# Patient Record
Sex: FEMALE | Race: WHITE | HISPANIC OR LATINO | Employment: UNEMPLOYED | URBAN - METROPOLITAN AREA
[De-identification: names, ages, dates, MRNs, and addresses within clinical notes are randomized per-mention and may not be internally consistent; named-entity substitution may affect disease eponyms.]

---

## 2018-11-01 ENCOUNTER — OCCUPATIONAL MEDICINE (OUTPATIENT)
Dept: URGENT CARE | Facility: PHYSICIAN GROUP | Age: 38
End: 2018-11-01
Payer: COMMERCIAL

## 2018-11-01 VITALS
BODY MASS INDEX: 31.15 KG/M2 | DIASTOLIC BLOOD PRESSURE: 70 MMHG | OXYGEN SATURATION: 99 % | WEIGHT: 165 LBS | HEIGHT: 61 IN | TEMPERATURE: 97.6 F | HEART RATE: 73 BPM | SYSTOLIC BLOOD PRESSURE: 100 MMHG | RESPIRATION RATE: 14 BRPM

## 2018-11-01 DIAGNOSIS — T14.8XXA MUSCULOSKELETAL STRAIN: ICD-10-CM

## 2018-11-01 LAB
BREATH ALCOHOL COMMENT: NORMAL
POC BREATHALIZER: 0 PERCENT (ref 0–0.01)

## 2018-11-01 PROCEDURE — 99202 OFFICE O/P NEW SF 15 MIN: CPT | Performed by: FAMILY MEDICINE

## 2018-11-01 PROCEDURE — 82075 ASSAY OF BREATH ETHANOL: CPT | Performed by: FAMILY MEDICINE

## 2018-11-01 PROCEDURE — 80305 DRUG TEST PRSMV DIR OPT OBS: CPT | Performed by: FAMILY MEDICINE

## 2018-11-01 ASSESSMENT — PAIN SCALES - GENERAL: PAINLEVEL: 8=MODERATE-SEVERE PAIN

## 2018-11-01 NOTE — LETTER
"EMPLOYEE’S CLAIM FOR COMPENSATION/ REPORT OF INITIAL TREATMENT  FORM C-4    EMPLOYEE’S CLAIM - PROVIDE ALL INFORMATION REQUESTED   First Name  Garcia Last Name  Presley Givens Birthdate                    1980                Sex  female Claim Number   Home Address  [address] Age  38 y.o. Height  1.549 m (5' 1\") Weight  74.8 kg (165 lb) [facility]     [facility] Zip  [address] Telephone  [phone] (home)    Mailing Address  [address] [facility] Zip  [address] Primary Language Spoken  English    Insurer   Third Party   Ange Scott   Employee's Occupation (Job Title) When Injury or Occupational Disease Occurred       Employer's Name  [employer]  Telephone  [phone]    Employer Address  [address]  [facility]  Zip  [address]    Date of Injury  10/11/2018               Hour of Injury  4:30 PM Date Employer Notified  10/15/2018 Last Day of Work after Injury or Occupational Disease  10/31/2018 Supervisor to Whom Injury Reported  diana navarrete   Address or Location of Accident (if applicable)  [[address] ]   What were you doing at the time of accident? (if applicable)  cover trabajando lifting     How did this injury or occupational disease occur? (Be specific an answer in detail. Use additional sheet if necessary)  estavamos trabajando y mi companero q  estaba en estado de ebriead se revalo y jalo cover y me jalo asi enfrente y fue donde se prenso mi brazo con el cover y la base donde revisan    If you believe that you have an occupational disease, when did you first have knowledge of the disability and it relationship to your employment?  na Witnesses to the Accident  jonthan       Nature of Injury or Occupational Disease  Sprain  Part(s) of Body Injured or Affected  Elbow (R), ,     I certify that the above is true " and correct to the best of my knowledge and that I have provided this information in order to obtain the benefits of [place]  I hereby authorize any physician, chiropractor, surgeon, practitioner, or other person, any hospital, including [facility] or [facility], any medical service organization, any insurance company, or other institution or organization to release to each other, any medical or other information, including benefits paid or payable, pertinent to this injury or disease, except information relative to diagnosis, treatment and/or counseling for AIDS, psychological conditions, alcohol or controlled substances, for which I must give specific authorization.  A Photostat of this authorization shall be as valid as the original.     Date   Place   Employee’s Signature   THIS REPORT MUST BE COMPLETED AND MAILED WITHIN 3 WORKING DAYS OF TREATMENT   Place  [facility]  Name of Facility  [place]   Date  11/1/2018 Diagnosis  (T14.8XXA) Musculoskeletal strain Is there evidence the injured employee was under the influence of alcohol and/or another controlled substance at the time of accident?   Hour  12:09 PM Description of Injury or Disease  The encounter diagnosis was Musculoskeletal strain.     Treatment  Over-the-counter ibuprofen 600 mg 3 times daily as discussed with patient.  Regular stretching as discussed with patient.  Have you advised the patient to remain off work five days or more? No   X-Ray Findings      If Yes   From Date  To Date      From information given by the employee, together with medical evidence, can you directly connect this injury or occupational disease as job incurred?  Yes If No Full Duty  No Modified Duty  Yes   Is additional medical care by a physician indicated?  Yes If Modified Duty, Specify any Limitations / Restrictions  See form D 39.   Do  "you know of any previous injury or disease contributing to this condition or occupational disease?                            No   Date  11/1/2018 Print Doctor’s Name Daly Stiles M.D. I certify the employer’s copy of  this form was mailed on:   Address  [address] Insurer’s Use Only     [facility] Zip  [address]    Provider’s Tax ID Number  [id number] Telephone  Dept: [phone]        marielos-DALY Noel M.D.   e-Signature: Dr. Manuel Oneill, Medical Director Degree  MD        ORIGINAL-TREATING PHYSICIAN OR CHIROPRACTOR    PAGE 2-INSURER/TPA    PAGE 3-EMPLOYER    PAGE 4-EMPLOYEE             Form C-4 (rev10/07)              BRIEF DESCRIPTION OF RIGHTS AND BENEFITS  (Pursuant to [place] 616C.050)    Notice of Injury or Occupational Disease (Incident Report Form C-1): If an injury or occupational disease (OD) arises out of and in the  course of employment, you must provide written notice to your employer as soon as practicable, but no later than 7 days after the accident or  OD. Your employer shall maintain a sufficient supply of the required forms.    Claim for Compensation (Form C-4): If medical treatment is sought, the form C-4 is available at the place of initial treatment. A completed  \"Claim for Compensation\" (Form C-4) must be filed within 90 days after an accident or OD. The treating physician or chiropractor must,  within 3 working days after treatment, complete and mail to the employer, the employer's insurer and third-party , the Claim for  Compensation.    Medical Treatment: If you require medical treatment for your on-the-job injury or OD, you may be required to select a physician or  chiropractor from a list provided by your workers’ compensation insurer, if it has contracted with an Organization for Managed Care (MCO) or  Preferred Provider Organization (PPO) or providers of health care. If your employer has not entered into a contract with an MCO or PPO, you  may " select a physician or chiropractor from the Panel of Physicians and Chiropractors. Any medical costs related to your industrial injury or  OD will be paid by your insurer.    Temporary Total Disability (TTD): If your doctor has certified that you are unable to work for a period of at least 5 consecutive days, or 5  cumulative days in a 20-day period, or places restrictions on you that your employer does not accommodate, you may be entitled to TTD  compensation.    Temporary Partial Disability (TPD): If the wage you receive upon reemployment is less than the compensation for TTD to which you are  entitled, the insurer may be required to pay you TPD compensation to make up the difference. TPD can only be paid for a maximum of 24  months.    Permanent Partial Disability (PPD): When your medical condition is stable and there is an indication of a PPD as a result of your injury or  OD, within 30 days, your insurer must arrange for an evaluation by a rating physician or chiropractor to determine the degree of your PPD. The  amount of your PPD award depends on the date of injury, the results of the PPD evaluation and your age and wage.    Permanent Total Disability (PTD): If you are medically certified by a treating physician or chiropractor as permanently and totally disabled  and have been granted a PTD status by your insurer, you are entitled to receive monthly benefits not to exceed 66 2/3% of your average  monthly wage. The amount of your PTD payments is subject to reduction if you previously received a PPD award.    Vocational Rehabilitation Services: You may be eligible for vocational rehabilitation services if you are unable to return to the job due to a  permanent physical impairment or permanent restrictions as a result of your injury or occupational disease.    Transportation and Per Yeny Reimbursement: You may be eligible for travel expenses and per yeny associated with medical treatment.    Reopening: You may  be able to reopen your claim if your condition worsens after claim closure.    Appeal Process: If you disagree with a written determination issued by the insurer or the insurer does not respond to your request, you may  appeal to the Department of Administration, , by following the instructions contained in your determination letter. You must  appeal the determination within 70 days from the date of the determination letter at [address], [address], [place], [place]  [address], or [address], [place], [place] [address]. If you disagree with the  decision, you may appeal to the  Department of Administration, . You must file your appeal within 30 days from the date of the  decision  letter at [address], [address], [place], [place] [address], or [address], [place], [place] [address]. If you  disagree with a decision of an , you may file a petition for judicial review with the District Court. You must do so within 30  days of the Appeal Officer’s decision. You may be represented by an  at your own expense or you may contact the [facility] for possible  representation.    [place]  for Injured Workers (NAIW): If you disagree with a  decision, you may request that NAIW represent you  without charge at an  Hearing. For information regarding denial of benefits, you may contact the [facility] at: [address], [place] [address], [phone], or [address], [place] [address], [phone]    To File a Complaint with the Division: If you wish to file a complaint with the  of the Division of Industrial Relations (DIR),  please contact the Workers’ Compensation Section, [address], [address], [place], [place] [address], telephone [phone], or  [address], [place] [address], telephone [phone].    For assistance with Workers’ Compensation Issues: you may contact the Office of the Governor Consumer Health Assistance, [address] [place] [address], [address], [place], [place] [address], Toll Free 1-503.269.7432, Web site: [email or web address]., E-mail  [email or web address].                                                                                                                                                                                                                                   __________________________________________________________________                                                                   _________________                Employee Name / Signature                                                                                                                                                       Date                                                                                                                                                                                                     D-2 (rev. 10/07)

## 2018-11-01 NOTE — LETTER
[facility]  [address]  [place] [address]  Phone:  [phone] - Fax:  [phone]   [facility] Progress Report and Disability Certification  Date of Service: 11/1/2018   No Show:  No  Date / Time of Next Visit: 11/4/2018   Claim Information   Patient Name: Garcia Givens  Claim Number:     Employer: [employer]  Date of Injury: 10/11/2018     Insurer / TPA: Ange Scott  ID / SSN:     Occupation:    Diagnosis: The encounter diagnosis was Musculoskeletal strain.    Medical Information   Related to Industrial Injury? Yes    Subjective Complaints:  DOI 10/11/2018: Patient states that she was working with a cover when her  caused her to get hit and fall with a cover straining her right elbow.  Patient has attempted to work since that time at regular duty but has noticed that working is worsening her injury and is seeking help because the pain is getting unbearable at this time.  Patient has tried Tylenol and aspirin intermittently with moderate effect.  No previous injuries.  No numbness, tingling, weakness.    Objective Findings: Tenderness to palpation near the right radial head.  Full range of motion right elbow.  No tenderness to the olecranon process or epicondyles.  No swelling, edema, deformity.  Neurovascularly intact right upper extremity.   Pre-Existing Condition(s):     Assessment:   Initial Visit    Status: Additional Care Required  Permanent Disability:No    Plan: Medication    Diagnostics:      Comments:       Disability Information   Status: Released to Restricted Duty    From:  11/1/2018  Through: 11/4/2018 Restrictions are: Temporary   Physical Restrictions   Sitting:    Standing:    Stooping:    Bending:      Squatting:    Walking:    Climbing:    Pushing:      Pulling:  < or = to 4 hrs/day Other:    Reaching Above Shoulder (L):   Reaching Above Shoulder (R):       Reaching Below Shoulder  (L):    Reaching Below Shoulder (R):      Not to exceed Weight Limits   Carrying(hrs):   Weight Limit(lb): < or = to 10 pounds Lifting(hrs):   Weight  Limit(lb): < or = to 10 pounds   Comments:      Repetitive Actions   Hands: i.e. Fine Manipulations from Grasping:     Feet: i.e. Operating Foot Controls:     Driving / Operate Machinery:     Physician Name: Daly Stiles M.D. Physician Signature: DALY Rush M.D. e-Signature: Dr. Manuel Oneill, Medical Director   Clinic Name / Location: [address] [address] Clinic Phone Number: Dept: [phone]   Appointment Time: 11:00 Am Visit Start Time: 12:09 PM   Check-In Time:  11:37 Am Visit Discharge Time: 1:00PM   Original-Treating Physician or Chiropractor    Page 2-Insurer/TPA    Page 3-Employer    Page 4-Employee

## 2018-11-01 NOTE — PATIENT INSTRUCTIONS
Distensión muscular.  (Muscle Strain)  Shameka distensión muscular es shameka lesión que se produce cuando un músculo se estira más allá de pathak arleen normal. Cuando esto sucede, por lo general se desgarra un pequeño número de fibras musculares. La distensión muscular se califica en grados. Las distensiones de primer ron son aquellas en las cuales el desgarro y el dolor afectan a la levi cantidad de fibras musculares. Las distensiones de ambrocio y tercer ron involucran shameka proporción cada vez mayor de desgarro y dolor.  En general, la recuperación de shameka distensión muscular tarda de 1 a 2 semanas. La curación completa tarda de 5 a 6 semanas.  CAUSAS  Las distensiones musculares ocurren cuando se aplica shameka fuerza violenta y repentina sobre un músculo y scar se estira demasiado. [place] puede ocurrir cuando se levantan objetos, se practican deportes o en shameka caída.  FACTORES DE RIESGO  La distensión muscular es especialmente común en los atletas.  SIGNOS Y SÍNTOMAS  En el lugar de la distensión muscular se puede presentar lo siguiente:  · Dolor.  · Moretones.  · Hinchazón.  · Dificultad para usar el músculo debido al dolor o a un funcionamiento anormal.  DIAGNÓSTICO  El médico le hará un examen físico y le hará preguntas sobre lindy antecedentes médicos.  TRATAMIENTO  Con frecuencia, el mejor tratamiento para shameka distensión muscular es el reposo, y la aplicación de hielo y de compresas frías en la gallo de la lesión.  INSTRUCCIONES PARA EL CUIDADO EN EL HOGAR  · Use el método PRICE (por lindy siglas en inglés) de tratamiento para estimular la curación ck los primeros 2 a 3 días posteriores a la lesión. El método PRICE implica lo siguiente:  ¨ Proteger al músculo de nuevas lesiones.  ¨ Limitar la actividad y descansar la parte del cuerpo lesionada.  ¨ Aplicar hielo a la lesión. Para hacerlo, ponga hielo en shameka bolsa plástica. Coloque shameka toalla entre la piel y la bolsa de hielo. Luego aplique el hielo y déjelo actuar de 15 a  20 minutos por hora. Después del tercer día, cambie a compresas de calor húmedo.  ¨ Comprimir la gallo lesionada con shameka férula o venda elástica. Tenga cuidado de no ajustarla demasiado. [place] puede interferir con la circulación sanguínea o aumentar la hinchazón.  ¨ Mantener la gallo lesionada por encima del nivel del corazón con la mayor frecuencia posible.  · Utilice los medicamentos de venta kalen o recetados para calmar el dolor, el malestar o la fiebre, según se lo indique el médico.  · Realizar un calentamiento antes de hacer ejercicio ayuda a prevenir distensiones musculares futuras.  SOLICITE ATENCIÓN MÉDICA SI:  · Siente un dolor cada vez más intenso o hinchazón en la gallo lesionada.  · Siente adormecimiento, hormigueo o nota shameka pérdida importante de fuerza en la gallo lesionada.  ASEGÚRESE DE QUE:  · Comprende estas instrucciones.  · Controlará pathak afección.  · Recibirá ayuda de inmediato si no mejora o si empeora.  Esta información no tiene daniel fin reemplazar el consejo del médico. Asegúrese de hacerle al médico cualquier pregunta que tenga.  Document Released: 09/27/2006 Document Revised: 10/08/2014 Document Reviewed: 07/17/2014  [employer] Interactive Patient Education © 2017 Elsevier Inc.

## 2018-11-04 ENCOUNTER — OCCUPATIONAL MEDICINE (OUTPATIENT)
Dept: URGENT CARE | Facility: PHYSICIAN GROUP | Age: 38
End: 2018-11-04
Payer: COMMERCIAL

## 2018-11-04 VITALS
RESPIRATION RATE: 16 BRPM | HEART RATE: 86 BPM | BODY MASS INDEX: 31.15 KG/M2 | WEIGHT: 165 LBS | TEMPERATURE: 97.5 F | SYSTOLIC BLOOD PRESSURE: 100 MMHG | DIASTOLIC BLOOD PRESSURE: 70 MMHG | OXYGEN SATURATION: 98 % | HEIGHT: 61 IN

## 2018-11-04 DIAGNOSIS — T14.8XXA MUSCULOSKELETAL STRAIN: ICD-10-CM

## 2018-11-04 PROCEDURE — 99213 OFFICE O/P EST LOW 20 MIN: CPT | Mod: 29 | Performed by: NURSE PRACTITIONER

## 2018-11-04 RX ORDER — IBUPROFEN 200 MG
200 TABLET ORAL EVERY 6 HOURS PRN
COMMUNITY
End: 2020-02-21

## 2018-11-04 ASSESSMENT — PAIN SCALES - GENERAL: PAINLEVEL: 2=MINIMAL-SLIGHT

## 2018-11-04 NOTE — PROGRESS NOTES
"Subjective:      Seema Givens Ma is a 38 y.o. female who presents with Work-Related Injury (DOI 10/11/18, right arm pain)      Subjective Complaints:  DOI 10/11/2018: Patient states that she was working with a cover when her  caused her to get hit and fall with a cover straining her right elbow.  Patient has attempted to work since that time at regular duty but has noticed that working is worsening her injury and is seeking help because the pain is getting unbearable at this time.  Patient has tried Tylenol and aspirin intermittently with moderate effect.  No previous injuries.  No numbness, tingling, weakness.   11/4/18: first follow up : patient presents with right arm strain after a fall on 10/11/18. 2/10 pain without paresthesia. She is right hand dominant, no second job. She is has no previous injury to this area. She has been using ibuprofen and topical pain reliever.       HPI    ROS       Objective:     /70   Pulse 86   Temp 36.4 °C (97.5 °F) (Temporal)   Resp 16   Ht 1.549 m (5' 1\")   Wt 74.8 kg (165 lb)   LMP 11/01/2018   SpO2 98%   BMI 31.18 kg/m²      Physical Exam   Constitutional: She is oriented to person, place, and time. She appears well-developed and well-nourished. No distress.   Musculoskeletal: Normal range of motion.        Right elbow: She exhibits normal range of motion, no swelling, no effusion and no deformity.        Right upper arm: She exhibits no tenderness, no bony tenderness and no swelling.        Right forearm: She exhibits no tenderness, no bony tenderness and no swelling.   Neurological: She is alert and oriented to person, place, and time.   Skin: Skin is warm and dry.   Psychiatric: She has a normal mood and affect. Her behavior is normal. Thought content normal.               Assessment/Plan:     1. Musculoskeletal strain       Full duty trial  Follow up Thursday for probable full release        "

## 2018-11-04 NOTE — LETTER
[facility] [place]  [address] [facility] [place] [address]  Phone:  [phone] - Fax:  [phone]   [facility] Progress Report and Disability Certification  Date of Service: 11/4/2018   No Show:  No  Date / Time of Next Visit: 11/8/2018   Claim Information   Patient Name: Seema Givens Ma  Claim Number:     Employer: [employer]  Date of Injury: 10/11/2018     Insurer / TPA: Ange Scott  ID / SSN:     Occupation:    Diagnosis: The encounter diagnosis was Musculoskeletal strain.    Medical Information   Related to Industrial Injury? Yes    Subjective Complaints:  Subjective Complaints:  DOI 10/11/2018: Patient states that she was working with a cover when her  caused her to get hit and fall with a cover straining her right elbow.  Patient has attempted to work since that time at regular duty but has noticed that working is worsening her injury and is seeking help because the pain is getting unbearable at this time.  Patient has tried Tylenol and aspirin intermittently with moderate effect.  No previous injuries.  No numbness, tingling, weakness.   11/4/18: first follow up : patient presents with right arm strain after a fall on 10/11/18. 2/10 pain without paresthesia. She is right hand dominant, no second job. She is has no previous injury to this area. She has been using ibuprofen and topical pain reliever.     Objective Findings: Physical Exam   Constitutional: She is oriented to person, place, and time. She appears well-developed and well-nourished. No distress.   Musculoskeletal: Normal range of motion.        Right elbow: She exhibits normal range of motion, no swelling, no effusion and no deformity.        Right upper arm: She exhibits no tenderness, no bony tenderness and no swelling.        Right forearm: She exhibits no tenderness, no bony tenderness and no swelling.   Neurological: She is alert and oriented to  person, place, and time.   Skin: Skin is warm and dry.   Psychiatric: She has a normal mood and affect. Her behavior is normal. Thought content normal.      Pre-Existing Condition(s):     Assessment:   Condition Improved    Status: Additional Care Required  Permanent Disability:No    Plan:      Diagnostics:      Comments:       Disability Information   Status: Released to Full Duty    From:  11/4/2018  Through: 11/8/2018 Restrictions are: Temporary   Physical Restrictions   Sitting:    Standing:    Stooping:    Bending:      Squatting:    Walking:    Climbing:    Pushing:      Pulling:    Other:    Reaching Above Shoulder (L):   Reaching Above Shoulder (R):       Reaching Below Shoulder (L):    Reaching Below Shoulder (R):      Not to exceed Weight Limits   Carrying(hrs):   Weight Limit(lb):   Lifting(hrs):   Weight  Limit(lb):     Comments:      Repetitive Actions   Hands: i.e. Fine Manipulations from Grasping:     Feet: i.e. Operating Foot Controls:     Driving / Operate Machinery:     Physician Name: AMANDA Drummond Physician Signature: ADAM Martin e-Signature: Dr. Manuel Oneill, Medical Director   Clinic Name / Location: [facility] [address] [address] Clinic Phone Number: Dept: [phone]   Appointment Time: 9:20 Am Visit Start Time: 9:29 AM   Check-In Time:  9:15 Am Visit Discharge Time:  9:45 AM    Original-Treating Physician or Chiropractor    Page 2-Insurer/TPA    Page 3-Employer    Page 4-Employee

## 2018-11-05 NOTE — PROGRESS NOTES
"Subjective:     Seema Givens Ma is a 38 y.o. female who presents for Work-Related Injury (DOI 10/11/18, right arm injury)    DOI 10/11/2018: Patient states that she was working with a cover when her  caused her to get hit and fall with a cover straining her right elbow.  Patient has attempted to work since that time at regular duty but has noticed that working is worsening her injury and is seeking help because the pain is getting unbearable at this time.  Patient has tried Tylenol and aspirin intermittently with moderate effect.  No previous injuries.  No numbness, tingling, weakness.   HPI  ROS  No Known Allergies   Objective:   /70   Pulse 73   Temp 36.4 °C (97.6 °F) (Temporal)   Resp 14   Ht 1.549 m (5' 1\")   Wt 74.8 kg (165 lb)   LMP 11/01/2018   SpO2 99%   BMI 31.18 kg/m²   Physical Exam   Constitutional: She is oriented to person, place, and time. She appears well-developed and well-nourished. No distress.   HENT:   Head: Normocephalic and atraumatic.   Eyes: Pupils are equal, round, and reactive to light. Conjunctivae and EOM are normal.   Cardiovascular: Normal rate, regular rhythm, normal heart sounds and intact distal pulses.    No murmur heard.  Pulmonary/Chest: Effort normal and breath sounds normal. No respiratory distress.   Abdominal: Soft. Bowel sounds are normal. She exhibits no distension. There is no tenderness.   Musculoskeletal: Normal range of motion.   Neurological: She is alert and oriented to person, place, and time. She has normal reflexes. No sensory deficit.   Skin: Skin is warm and dry.   Psychiatric: She has a normal mood and affect. Her behavior is normal.     Tenderness to palpation near the right radial head.  Full range of motion right elbow.  No tenderness to the olecranon process or epicondyles.  No swelling, edema, deformity.  Neurovascularly intact right upper extremity.  Assessment/Plan:   1. Musculoskeletal strain  - POCT Breath Alcohol " Test  Differential diagnosis, natural history, supportive care, and indications for immediate follow-up discussed.    Use over-the-counter pain reliever, such as acetaminophen (Tylenol), ibuprofen (Advil, Motrin) or naproxen (Aleve) as needed; follow package directions for dosing.

## 2018-11-08 ENCOUNTER — OCCUPATIONAL MEDICINE (OUTPATIENT)
Dept: URGENT CARE | Facility: PHYSICIAN GROUP | Age: 38
End: 2018-11-08
Payer: COMMERCIAL

## 2018-11-08 VITALS
HEIGHT: 61 IN | HEART RATE: 65 BPM | RESPIRATION RATE: 14 BRPM | DIASTOLIC BLOOD PRESSURE: 78 MMHG | BODY MASS INDEX: 31.15 KG/M2 | TEMPERATURE: 97.9 F | OXYGEN SATURATION: 97 % | SYSTOLIC BLOOD PRESSURE: 118 MMHG | WEIGHT: 165 LBS

## 2018-11-08 DIAGNOSIS — S46.911D STRAIN OF RIGHT ELBOW, SUBSEQUENT ENCOUNTER: ICD-10-CM

## 2018-11-08 PROCEDURE — 99214 OFFICE O/P EST MOD 30 MIN: CPT | Mod: 29 | Performed by: PHYSICIAN ASSISTANT

## 2018-11-08 ASSESSMENT — ENCOUNTER SYMPTOMS
EYE REDNESS: 0
FEVER: 0
TINGLING: 0
BACK PAIN: 0
JOINT SWELLING: 0
FALLS: 0
SENSORY CHANGE: 0
CHILLS: 0
EYE DISCHARGE: 0
SORE THROAT: 0

## 2018-11-08 NOTE — PROGRESS NOTES
"Subjective:      Presley Stephenson is a 38 y.o. female who presents with Follow-Up (wc fv  r elbow not getting better )      DOI 10/11/2018: Visit #3- PT reports that her elbow is feeling worse- in particular with lifting any type of weight and turning of her right wrist as patient feels the pain in her elbow.  Patient reports that she has been wrapping her elbow with mild relief his symptoms.  She is intermittently used ice and over-the-counter NSAIDs for slight relief.  Patient reports that she has not been allowed to return to work the last few days however does report simple movements like picking up a mild jug,cause pain into her elbow.   Injury: Patient states that she was working with a cover when her  caused her to get hit and fall with a cover straining her right elbow.  Patient has attempted to work since that time at regular duty but has noticed that working is worsening her injury and is seeking help because the pain is getting unbearable at this time.     Other   Chronicity: 10/11. The current episode started 1 to 4 weeks ago. The problem occurs intermittently. The problem has been waxing and waning. Pertinent negatives include no chills, congestion, fever, joint swelling, rash or sore throat. Exacerbated by: As above.  She has tried ice and NSAIDs (Compression) for the symptoms.       Review of Systems   Constitutional: Negative for chills and fever.   HENT: Negative for congestion and sore throat.    Eyes: Negative for discharge and redness.   Musculoskeletal: Positive for joint pain. Negative for back pain, falls and joint swelling.   Skin: Negative for rash.   Neurological: Negative for tingling and sensory change.   All other systems reviewed and are negative.         Objective:     /78   Pulse 65   Temp 36.6 °C (97.9 °F) (Temporal)   Resp 14   Ht 1.549 m (5' 1\")   Wt 74.8 kg (165 lb)   LMP 11/01/2018   SpO2 97%   BMI 31.18 kg/m²    PMH:  has no past medical history " on file.  MEDS:   Current Outpatient Prescriptions:   •  ibuprofen (MOTRIN) 200 MG Tab, Take 200 mg by mouth every 6 hours as needed., Disp: , Rfl:   ALLERGIES: No Known Allergies  SURGHX: No past surgical history on file.  SOCHX:  reports that she has never smoked. She has never used smokeless tobacco. She reports that she does not drink alcohol or use drugs.  FH: Family history was reviewed, no pertinent findings to report    Physical Exam   Constitutional: She is oriented to person, place, and time. She appears well-developed and well-nourished. No distress.   HENT:   Head: Normocephalic and atraumatic.   Eyes: Pupils are equal, round, and reactive to light. Conjunctivae and EOM are normal.   Neck: Normal range of motion. Neck supple. No tracheal deviation present.   Cardiovascular: Normal rate.    Pulmonary/Chest: Effort normal.   Musculoskeletal: Normal range of motion.   Neurological: She is alert and oriented to person, place, and time.   Skin: Skin is warm.   Psychiatric: She has a normal mood and affect. Her behavior is normal. Judgment and thought content normal.   Vitals reviewed.      Right elbow- FROM- however painful supination- slight tenderness along bilateral lateral and medical epicondyles- without posterior elbow tenderness. N/V intact distally. Wrist/hand NT. N/V intact distally.          Assessment/Plan:     1. Strain of right elbow, subsequent encounter  - REFERRAL TO PHYSICAL THERAPY Reason for Therapy: Eval/Treat/Report    Please see D39 for further information- pt. Feeling worse at this time- referral to PT was made, light duty. F/U with [facility] in 2 weeks.   Ice, NSAIDs, compression wrap as discussed.   RTC sooner for worsening symptoms.

## 2018-11-08 NOTE — LETTER
[facility]  [address]  [place] [address]  Phone:  [phone] - Fax:  [phone]   [facility] Progress Report and Disability Certification  Date of Service: 11/8/2018   No Show:  No  Date / Time of Next Visit: 11/22/2018   Claim Information   Patient Name: Presley Stephenson  Claim Number:     Employer: [employer]  Date of Injury: 10/11/2018     Insurer / TPA:   Ange Scott ID / SSN:     Occupation:    Diagnosis: The encounter diagnosis was Strain of right elbow, subsequent encounter.    Medical Information   Related to Industrial Injury? Yes    Subjective Complaints:  DOI 10/11/2018: Visit #3- PT reports that her elbow is feeling worse- in particular with lifting any type of weight and turning of her right wrist as patient feels the pain in her elbow.  Patient reports that she has been wrapping her elbow with mild relief his symptoms.  She is intermittently used ice and over-the-counter NSAIDs for slight relief.  Patient reports that she has not been allowed to return to work the last few days however does report simple movements like picking up a mild jug,cause pain into her elbow.   Injury: Patient states that she was working with a cover when her  caused her to get hit and fall with a cover straining her right elbow.  Patient has attempted to work since that time at regular duty but has noticed that working is worsening her injury and is seeking help because the pain is getting unbearable at this time.   Objective Findings: Right elbow- FROM- however painful supination- slight tenderness along bilateral lateral and medical epicondyles- without posterior elbow tenderness. N/V intact distally. Wrist/hand NT. N/V intact distally.      Pre-Existing Condition(s):     Assessment:   Condition Same    Status: Discharged / Care Transfer  Permanent Disability:No    [facility]  Comments:Referral to PT  was made today. [facility] 2 weeks.     Diagnostics:      Comments:       Disability Information   Status: Released to Restricted Duty    From:  2018  Through: 2018 Restrictions are: Temporary   Physical Restrictions   Sitting:    Standing:    Stooping:    Bending:      Squatting:    Walking:    Climbing:    Pushing:      Pullin hrs/day Other:    Reaching Above Shoulder (L):   Reaching Above Shoulder (R):       Reaching Below Shoulder (L):    Reaching Below Shoulder (R):      Not to exceed Weight Limits   Carrying(hrs):   Weight Limit(lb): < or = to 10 pounds Lifting(hrs):   Weight  Limit(lb): < or = to 10 pounds   Comments: Referral to PT was made today, pt. Is to be on light duty and will follow up with [facility]- in 2 weeks.   Ice, compression bandage as discussed, and NSAIDs.     Repetitive Actions   Hands: i.e. Fine Manipulations from Grasping:     Feet: i.e. Operating Foot Controls:     Driving / Operate Machinery:     Physician Name: Mac Barrow P.A.-C. Physician Signature: MAC Strong P.A.-C. e-Signature: Dr. Manuel Oneill, Medical Director   Clinic Name / Location: [address] [address] Clinic Phone Number: Dept: [phone]   Appointment Time: 10:00 Am Visit Start Time: 9:24 AM   Check-In Time:  9:16 Am Visit Discharge Time:  10:00AM   Original-Treating Physician or Chiropractor    Page 2-Insurer/TPA    Page 3-Employer    Page 4-Employee

## 2018-11-21 ENCOUNTER — APPOINTMENT (OUTPATIENT)
Dept: RADIOLOGY | Facility: IMAGING CENTER | Age: 38
End: 2018-11-21
Attending: PREVENTIVE MEDICINE
Payer: COMMERCIAL

## 2018-11-21 ENCOUNTER — OCCUPATIONAL MEDICINE (OUTPATIENT)
Dept: OCCUPATIONAL MEDICINE | Facility: CLINIC | Age: 38
End: 2018-11-21
Payer: COMMERCIAL

## 2018-11-21 VITALS
DIASTOLIC BLOOD PRESSURE: 64 MMHG | HEART RATE: 92 BPM | OXYGEN SATURATION: 97 % | BODY MASS INDEX: 30.55 KG/M2 | SYSTOLIC BLOOD PRESSURE: 102 MMHG | WEIGHT: 166 LBS | HEIGHT: 62 IN | TEMPERATURE: 97.7 F

## 2018-11-21 DIAGNOSIS — M77.11 LATERAL EPICONDYLITIS OF RIGHT ELBOW: ICD-10-CM

## 2018-11-21 PROCEDURE — 73080 X-RAY EXAM OF ELBOW: CPT | Mod: TC,RT,29 | Performed by: NURSE PRACTITIONER

## 2018-11-21 PROCEDURE — 99203 OFFICE O/P NEW LOW 30 MIN: CPT | Performed by: PREVENTIVE MEDICINE

## 2018-11-21 RX ORDER — PREDNISONE 20 MG/1
40 TABLET ORAL DAILY
Qty: 14 TAB | Refills: 0 | Status: SHIPPED | OUTPATIENT
Start: 2018-11-21 | End: 2018-11-28

## 2018-11-21 RX ORDER — DICLOFENAC SODIUM 75 MG/1
75 TABLET, DELAYED RELEASE ORAL 2 TIMES DAILY
Qty: 60 TAB | Refills: 0 | Status: SHIPPED | OUTPATIENT
Start: 2018-11-21 | End: 2019-01-21

## 2018-11-21 NOTE — LETTER
[address],   [address] [place] [address]  Phone:  [phone] - Fax:  [phone]   Occupational Health [facility] Progress Report and Disability Certification  Date of Service: 11/21/2018   No Show:  No  Date / Time of Next Visit: 12/11/2018 @ 11AM   Claim Information   Patient Name: Presley Stephenson  Claim Number:     Employer: [employer]  Date of Injury: 10/11/2018     Insurer / TPA:   Ange ID / SSN:     Occupation:    Diagnosis: The encounter diagnosis was Lateral epicondylitis of right elbow.    Medical Information   Related to Industrial Injury?   Comments:indeterminate    Subjective Complaints:  DOI 10/11/2018: 37 yo female presents with right elbow injury.  She was moving a cover when a coworker bumped her and she hit her right elbow.  She did not present to urgent care for 3 weeks.  She has been icing, using ibuprofen and wrapping it in Ace bandage.  Patient states that her right elbow pain seems to be worsening.  Pain is mostly on the lateral side of the elbow.  Pain is worse with wrist flexion extension.  She has difficulty lifting things.  She states even showering is painful at this point.  She has been taking ibuprofen with minimal relief.  She has noted couple episodes of tingling in the right hand.  Denies prior right elbow injuries.   Objective Findings: Right elbow: No gross deformity.  Tenderness over the lateral epicondyle and proximal forearm.  Full range of motion with pain with resisted supination, wrist flexion/extension.   Pre-Existing Condition(s):     Assessment:   Condition Same    Status: Additional Care Required  Permanent Disability:No    Plan:      Diagnostics:      Comments:  XR Right elbow: No acute deformity as read by me  Refer to physical therapy pending  Prescribed prednisone and diclofenac  Recommend tennis elbow strap  Restricted duty  Follow-up 3 weeks    Disability Information      Status: Released to Restricted Duty    From:  11/21/2018  Through: 12/11/2018 Restrictions are: Temporary   Physical Restrictions   Sitting:    Standing:    Stooping:    Bending:      Squatting:    Walking:    Climbing:    Pushing:      Pulling:    Other:    Reaching Above Shoulder (L):   Reaching Above Shoulder (R):       Reaching Below Shoulder (L):    Reaching Below Shoulder (R):      Not to exceed Weight Limits   Carrying(hrs):   Weight Limit(lb):   Lifting(hrs):   Weight  Limit(lb):     Comments: Limit right arm to less than 10 lbs lift/push/pull.    Repetitive Actions   Hands: i.e. Fine Manipulations from Grasping:     Feet: i.e. Operating Foot Controls:     Driving / Operate Machinery:     Physician Name: Dung Tomlin D.O. Physician Signature: DUNG Rodrigez D.O. e-Signature: Dr. Manuel Oneill, Medical Director   Clinic Name / Location: [address],   [address] [place] [address] Clinic Phone Number: Dept: [phone]   Appointment Time: 10:00 Am Visit Start Time: 9:51 AM   Check-In Time:  9:45 Am Visit Discharge Time:  10:51 AM   Original-Treating Physician or Chiropractor    Page 2-Insurer/TPA    Page 3-Employer    Page 4-Employee

## 2018-11-21 NOTE — PROGRESS NOTES
"Subjective:      Presley Stephenson is a 38 y.o. female who presents with Follow-Up (WC DOI 10/11/2018 R elbow -same-)      DOI 10/11/2018: 39 yo female presents with right elbow injury.  She was moving a cover when a coworker bumped her and she hit her right elbow.  She did not present to urgent care for 3 weeks.  She has been icing, using ibuprofen and wrapping it in Ace bandage.  Patient states that her right elbow pain seems to be worsening.  Pain is mostly on the lateral side of the elbow.  Pain is worse with wrist flexion extension.  She has difficulty lifting things.  She states even showering is painful at this point.  She has been taking ibuprofen with minimal relief.  She has noted couple episodes of tingling in the right hand.  Denies prior right elbow injuries.     HPI    ROS  ROS: All systems were reviewed on intake form, form was reviewed and signed. See scanned documents in media. Pertinent positives and negatives included in HPI.    PMH: No pertinent past medical history to this problem  MEDS: Medications were reviewed in Epic  ALLERGIES: No Known Allergies  SOCHX: Works as a at an  at [email or web address]  FH: No pertinent family history to this problem     Objective:     /64 (BP Location: Left arm, Patient Position: Sitting)   Pulse 92   Temp 36.5 °C (97.7 °F)   Ht 1.575 m (5' 2\")   Wt 75.3 kg (166 lb)   LMP 11/01/2018   SpO2 97%   BMI 30.36 kg/m²      Physical Exam   Constitutional: She is oriented to person, place, and time. She appears well-developed and well-nourished.   HENT:   Right Ear: External ear normal.   Left Ear: External ear normal.   Eyes: Conjunctivae and EOM are normal.   Cardiovascular: Normal rate.    Pulmonary/Chest: Effort normal.   Neurological: She is alert and oriented to person, place, and time.   Skin: Skin is warm and dry.   Psychiatric: She has a normal mood and affect. Judgment normal.       Right elbow: No gross deformity.  Tenderness over " the lateral epicondyle and proximal forearm.  Full range of motion with pain with resisted supination, wrist flexion/extension.       Assessment/Plan:     1. Lateral epicondylitis of right elbow  - DX-ELBOW-COMPLETE 3+ RIGHT; Future    XR Right elbow: No acute deformity as read by me  Refer to physical therapy pending  Prescribed prednisone and diclofenac  Recommend tennis elbow strap  Restricted duty  Follow-up 3 weeks

## 2018-12-11 ENCOUNTER — OCCUPATIONAL MEDICINE (OUTPATIENT)
Dept: OCCUPATIONAL MEDICINE | Facility: CLINIC | Age: 38
End: 2018-12-11
Payer: COMMERCIAL

## 2018-12-11 VITALS
HEART RATE: 63 BPM | BODY MASS INDEX: 31.1 KG/M2 | HEIGHT: 62 IN | DIASTOLIC BLOOD PRESSURE: 68 MMHG | WEIGHT: 169 LBS | TEMPERATURE: 97 F | RESPIRATION RATE: 16 BRPM | OXYGEN SATURATION: 100 % | SYSTOLIC BLOOD PRESSURE: 106 MMHG

## 2018-12-11 DIAGNOSIS — M77.11 LATERAL EPICONDYLITIS OF RIGHT ELBOW: ICD-10-CM

## 2018-12-11 PROCEDURE — 99213 OFFICE O/P EST LOW 20 MIN: CPT | Performed by: PREVENTIVE MEDICINE

## 2018-12-11 ASSESSMENT — PAIN SCALES - GENERAL: PAINLEVEL: 8=MODERATE-SEVERE PAIN

## 2018-12-11 ASSESSMENT — ENCOUNTER SYMPTOMS
SENSORY CHANGE: 0
TINGLING: 0

## 2018-12-11 NOTE — PROGRESS NOTES
"Subjective:      Presley Stephenson is a 38 y.o. female who presents with Follow-Up (WC DOI 10/11/2018 R elbow)      DOI 10/11/2018: 37 yo female presents with right elbow injury.  She was moving a cover when a coworker bumped her and she hit her right elbow.  She did not present to urgent care for 3 weeks.  Patient states overall right elbow pain is about the same.  He does continue to have pain with resisted supination and at the lateral condyle.  She states she is using a topical OTC ointment which does help, she believes it IcyHot.  She is not working due to restrictions.  She states that they will not let her work with any restrictions.  Physical therapy has yet to be approved.     HPI    Review of Systems   Neurological: Negative for tingling and sensory change.     SOCHX: Works as a  at [employer]   FH: No pertinent family history to this problem.     Objective:     /68 (BP Location: Right arm, Patient Position: Sitting, BP Cuff Size: Adult)   Pulse 63   Temp 36.1 °C (97 °F) (Temporal)   Resp 16   Ht 1.575 m (5' 2\")   Wt 76.7 kg (169 lb)   SpO2 100%   BMI 30.91 kg/m²      Physical Exam   Constitutional: She is oriented to person, place, and time. She appears well-developed and well-nourished.   Cardiovascular: Normal rate.    Pulmonary/Chest: Effort normal.   Neurological: She is alert and oriented to person, place, and time.   Skin: Skin is warm and dry.   Psychiatric: She has a normal mood and affect. Judgment normal.       Right elbow: No gross deformity.  Tenderness over the lateral epicondyle.  Full range of motion with pain with resisted supination.       Assessment/Plan:     1. Lateral epicondylitis of right elbow  Referral to physical therapy pending  Continue diclofenac  Recommend tennis elbow strap  Restricted duty  Follow-up 3 weeks      "

## 2018-12-11 NOTE — LETTER
[address],   [address] [place] [address]  Phone:  [phone] - Fax:  [phone]   Occupational Health [facility] Progress Report and Disability Certification  Date of Service: 12/11/2018   No Show:  No  Date / Time of Next Visit: 1/3/2019 @ 2:30 PM   Claim Information   Patient Name: Presley Stephenson  Claim Number:     Employer: [employer]  Date of Injury: 10/11/2018     Insurer / TPA:   Ange ID / SSN:     Occupation:    Diagnosis: The encounter diagnosis was Lateral epicondylitis of right elbow.    Medical Information   Related to Industrial Injury?   Comments:indeterminate    Subjective Complaints:  DOI 10/11/2018: 39 yo female presents with right elbow injury.  She was moving a cover when a coworker bumped her and she hit her right elbow.  She did not present to urgent care for 3 weeks.  Patient states overall right elbow pain is about the same.  He does continue to have pain with resisted supination and at the lateral condyle.  She states she is using a topical OTC ointment which does help, she believes it IcyHot.  She is not working due to restrictions.  She states that they will not let her work with any restrictions.  Physical therapy has yet to be approved.   Objective Findings: Right elbow: No gross deformity.  Tenderness over the lateral epicondyle.  Full range of motion with pain with resisted supination.   Pre-Existing Condition(s):     Assessment:   Condition Same    Status: Additional Care Required  Permanent Disability:No    Plan:      Diagnostics:      Comments:  Referral to physical therapy pending  Continue diclofenac  Recommend tennis elbow strap  Restricted duty  Follow-up 3 weeks    Disability Information   Status: Released to Restricted Duty    From:  12/11/2018  Through: 1/3/2019 Restrictions are: Temporary   Physical Restrictions   Sitting:    Standing:    Stooping:    Bending:       Squatting:    Walking:    Climbing:    Pushing:      Pulling:    Other:    Reaching Above Shoulder (L):   Reaching Above Shoulder (R):       Reaching Below Shoulder (L):    Reaching Below Shoulder (R):      Not to exceed Weight Limits   Carrying(hrs):   Weight Limit(lb):   Lifting(hrs):   Weight  Limit(lb):     Comments: Limit right arm to less than 10 lbs lift/push/pull.    Repetitive Actions   Hands: i.e. Fine Manipulations from Grasping:     Feet: i.e. Operating Foot Controls:     Driving / Operate Machinery:     Physician Name: Dung Tomlin D.O. Physician Signature: [email or web address] LEVI D.O. e-Signature: Dr. Manuel Oneill, Medical Director   Clinic Name / Location: [address],   [address] [address] Clinic Phone Number: Dept: [phone]   Appointment Time: 11:00 Am Visit Start Time: 10:41 AM   Check-In Time:  10:41 Am Visit Discharge Time:  11:15 AM   Original-Treating Physician or Chiropractor    Page 2-Insurer/TPA    Page 3-Employer    Page 4-Employee

## 2019-01-21 ENCOUNTER — OCCUPATIONAL MEDICINE (OUTPATIENT)
Dept: OCCUPATIONAL MEDICINE | Facility: CLINIC | Age: 39
End: 2019-01-21
Payer: COMMERCIAL

## 2019-01-21 VITALS
DIASTOLIC BLOOD PRESSURE: 84 MMHG | OXYGEN SATURATION: 96 % | WEIGHT: 169 LBS | HEART RATE: 63 BPM | SYSTOLIC BLOOD PRESSURE: 122 MMHG | RESPIRATION RATE: 12 BRPM | BODY MASS INDEX: 31.1 KG/M2 | HEIGHT: 62 IN | TEMPERATURE: 97.5 F

## 2019-01-21 DIAGNOSIS — M77.11 LATERAL EPICONDYLITIS OF RIGHT ELBOW: ICD-10-CM

## 2019-01-21 PROCEDURE — 99213 OFFICE O/P EST LOW 20 MIN: CPT | Performed by: PREVENTIVE MEDICINE

## 2019-01-21 RX ORDER — MELOXICAM 7.5 MG/1
7.5 TABLET ORAL DAILY
Qty: 30 TAB | Refills: 0 | Status: SHIPPED | OUTPATIENT
Start: 2019-01-21 | End: 2020-02-21

## 2019-01-21 ASSESSMENT — ENCOUNTER SYMPTOMS
TINGLING: 0
SENSORY CHANGE: 0

## 2019-01-21 NOTE — LETTER
[address],   [address] [place] [address]  Phone:  [phone] - Fax:  [phone]   Occupational Health [facility] Progress Report and Disability Certification  Date of Service: 1/21/2019   No Show:  No  Date / Time of Next Visit: 2/20/2019 @ 10:15    Claim Information   Patient Name: Presley Stephenson  Claim Number:     Employer: [employer]  Date of Injury: 10/11/2018     Insurer / TPA:   Ange ID / SSN:     Occupation:    Diagnosis: The encounter diagnosis was Lateral epicondylitis of right elbow.    Medical Information   Related to Industrial Injury? Yes    Subjective Complaints:  DOI 10/11/2018: 37 yo female presents with right elbow injury.  She was moving a cover when a coworker bumped her and she hit her right elbow.  Patient was being treated but last month her claim was denied and physical therapy denied.  She appealed and her claim was approved.  Patient states that overall symptoms are about the same.  She states that she does not use the right arm there is not much pain.  She states pain is worse with lifting pushing/pulling.  She has been using a tennis elbow strap which does seem to help.  Occasional ibuprofen for relief.   Objective Findings: Right elbow: No gross deformity.  Tenderness over the lateral epicondyle.  Full range of motion. Pain with resisted supination otherwise strength intact.   Pre-Existing Condition(s):     Assessment:   Condition Same    Status: Additional Care Required  Permanent Disability:No    Plan:      Diagnostics:      Comments:  Referral to physical therapy  Prescribed meloxicam  Continue restricted duty  Follow up 3 weeks    Disability Information   Status: Released to Restricted Duty    From:  1/21/2019  Through: 2/20/2019 Restrictions are: Temporary   Physical Restrictions   Sitting:    Standing:    Stooping:    Bending:      Squatting:    Walking:    Climbing:    Pushing:     Pulling:    Other:    Reaching Above Shoulder (L):   Reaching Above Shoulder (R):       Reaching Below Shoulder (L):    Reaching Below Shoulder (R):      Not to exceed Weight Limits   Carrying(hrs):   Weight Limit(lb):   Lifting(hrs):   Weight  Limit(lb):     Comments: Limit right arm to less than 20 lbs lift/push/pull.     Repetitive Actions   Hands: i.e. Fine Manipulations from Grasping:     Feet: i.e. Operating Foot Controls:     Driving / Operate Machinery:     Physician Name: Dung Tomlin D.O. Physician Signature: DUNG Rodrigez D.O. e-Signature: Dr. Manuel Oneill, Medical Director   Clinic Name / Location: [address],   [address] [place] [address] Clinic Phone Number: Dept: [phone]   Appointment Time: 9:45 Am Visit Start Time: 9:52 AM   Check-In Time:  9:51 Am Visit Discharge Time:  10:32 AM   Original-Treating Physician or Chiropractor    Page 2-Insurer/TPA    Page 3-Employer    Page 4-Employee

## 2019-01-21 NOTE — PROGRESS NOTES
"Subjective:      Presley Stephenson is a 38 y.o. female who presents with Other (W/C FV 10-11-18 RIGHT ELBOW- same- room 17)      DOI 10/11/2018: 39 yo female presents with right elbow injury.  She was moving a cover when a coworker bumped her and she hit her right elbow.  Patient was being treated but last month her claim was denied and physical therapy denied.  She appealed and her claim was approved.  Patient states that overall symptoms are about the same.  She states that she does not use the right arm there is not much pain.  She states pain is worse with lifting pushing/pulling.  She has been using a tennis elbow strap which does seem to help.  Occasional ibuprofen for relief.     HPI    Review of Systems   Skin: Negative for itching and rash.   Neurological: Negative for tingling and sensory change.     Lazy Angel: Works as an  at [employer]   FH: No pertinent family history to this problem.       Objective:     /84   Pulse 63   Temp 36.4 °C (97.5 °F)   Resp 12   Ht 1.575 m (5' 2\")   Wt 76.7 kg (169 lb)   SpO2 96%   BMI 30.91 kg/m²      Physical Exam   Constitutional: She is oriented to person, place, and time. She appears well-developed and well-nourished.   Cardiovascular: Normal rate.    Pulmonary/Chest: Effort normal.   Neurological: She is alert and oriented to person, place, and time.   Skin: Skin is warm and dry.   Psychiatric: She has a normal mood and affect. Judgment normal.       Right elbow: No gross deformity.  Tenderness over the lateral epicondyle.  Full range of motion. Pain with resisted supination otherwise strength intact.       Assessment/Plan:     1. Lateral epicondylitis of right elbow  - REFERRAL TO PHYSICAL THERAPY Reason for Therapy: Eval/Treat/Report  - meloxicam (MOBIC) 7.5 MG Tab; Take 1 Tab by mouth every day.  Dispense: 30 Tab; Refill: 0    Referral to physical therapy  Prescribed meloxicam  Continue restricted duty  Follow up 3 weeks  "

## 2019-02-22 ENCOUNTER — OCCUPATIONAL MEDICINE (OUTPATIENT)
Dept: OCCUPATIONAL MEDICINE | Facility: CLINIC | Age: 39
End: 2019-02-22
Payer: COMMERCIAL

## 2019-02-22 VITALS
OXYGEN SATURATION: 96 % | BODY MASS INDEX: 31.1 KG/M2 | HEART RATE: 66 BPM | WEIGHT: 169 LBS | SYSTOLIC BLOOD PRESSURE: 120 MMHG | HEIGHT: 62 IN | DIASTOLIC BLOOD PRESSURE: 88 MMHG | TEMPERATURE: 97.5 F

## 2019-02-22 DIAGNOSIS — M77.11 LATERAL EPICONDYLITIS OF RIGHT ELBOW: ICD-10-CM

## 2019-02-22 PROCEDURE — 99212 OFFICE O/P EST SF 10 MIN: CPT | Performed by: PREVENTIVE MEDICINE

## 2019-02-22 NOTE — LETTER
[address],   [address] [place] [address]  Phone:  [phone] - Fax:  [phone]   [facility] Progress Report and Disability Certification  Date of Service: 2/22/2019   No Show:  No  Date / Time of Next Visit: 4/2/2019 @ 1100am   Claim Information   Patient Name: Presley Stephenson  Claim Number:     Employer: [employer]  Date of Injury: 10/11/2018     Insurer / TPA: Ange Scott  ID / SSN:     Occupation:    Diagnosis: The encounter diagnosis was Lateral epicondylitis of right elbow.    Medical Information   Related to Industrial Injury?   Comments:Indeterminant    Subjective Complaints:  DOI 10/11/2018: 39 yo female presents with right elbow injury.  She was moving a cover when a coworker bumped her and she hit her right elbow.  Claim was denied originally but was overturned.  Referral to physical therapy was just approved.  Patient states overall elbow pain is the same.  Patient states she went to [employer] [facility] for a few weeks without improvement.  She states she has been told she need to go see another doctor, she believes it was .  He recommended that she go to [place] [facility] for 6 weeks, if no improvement then he would consider steroid injection.   Objective Findings: Right elbow: No gross deformity. Tenderness over the lateral epicondyle.  Full range of motion. Pain with resisted supination. strength intact.   Pre-Existing Condition(s):     Assessment:   Condition Same    Status: Additional Care Required  Permanent Disability:No    Plan:      Diagnostics:      Comments:  Continue [place] [facility]  Continue restricted duty  Follow-up 4 weeks    Disability Information   Status: Released to Restricted Duty    From:  2/22/2019  Through: 4/2/2019 Restrictions are: Temporary   Physical Restrictions   Sitting:    Standing:    Stooping:    Bending:      Squatting:    Walking:   Climbing:    Pushing:      Pulling:    Other:    Reaching Above Shoulder (L):   Reaching Above Shoulder (R):       Reaching Below Shoulder (L):    Reaching Below Shoulder (R):      Not to exceed Weight Limits   Carrying(hrs):   Weight Limit(lb):   Lifting(hrs):   Weight  Limit(lb):     Comments: Limit right arm to less than 20 lbs lift/push/pull.      Repetitive Actions   Hands: i.e. Fine Manipulations from Grasping:     Feet: i.e. Operating Foot Controls:     Driving / Operate Machinery:     Physician Name: Dung Tomlin D.O. Physician Signature: DUNG Rodrigez D.O. e-Signature: Dr. Manuel Oneill, Medical Director   Clinic Name / Location: [address],   [address] [place] [address] Clinic Phone Number: Dept: [phone]   Appointment Time: 3:45 Pm Visit Start Time: 3:44 PM   Check-In Time:  3:38 Pm Visit Discharge Time:  0427pm   Original-Treating Physician or Chiropractor    Page 2-Insurer/TPA    Page 3-Employer    Page 4-Employee

## 2019-02-23 NOTE — PROGRESS NOTES
"Subjective:      Presley Stephenson is a 38 y.o. female who presents with Follow-Up (10/11/2018- R elbow- same )      DOI 10/11/2018: 39 yo female presents with right elbow injury.  She was moving a cover when a coworker bumped her and she hit her right elbow.  Claim was denied originally but was overturned.  Referral to physical therapy was just approved.  Patient states overall elbow pain is the same.  Patient states she went to [facility] [employer] for a few weeks without improvement.  She states she has been told she need to go see another doctor, she believes it was .  He recommended that she go to [place] [facility] for 6 weeks, if no improvement then he would consider steroid injection.     HPI    ROS       Objective:     /88   Pulse 66   Temp 36.4 °C (97.5 °F)   Ht 1.575 m (5' 2\")   Wt 76.7 kg (169 lb)   SpO2 96%   BMI 30.91 kg/m²      Physical Exam    Right elbow: No gross deformity. Tenderness over the lateral epicondyle.  Full range of motion. Pain with resisted supination. strength intact.       Assessment/Plan:     1. Lateral epicondylitis of right elbow  Continue [place] [facility]  Continue restricted duty  Follow-up 4 weeks      "

## 2019-03-12 ENCOUNTER — OCCUPATIONAL MEDICINE (OUTPATIENT)
Dept: OCCUPATIONAL MEDICINE | Facility: CLINIC | Age: 39
End: 2019-03-12
Payer: COMMERCIAL

## 2019-03-12 VITALS
OXYGEN SATURATION: 94 % | DIASTOLIC BLOOD PRESSURE: 80 MMHG | HEART RATE: 55 BPM | SYSTOLIC BLOOD PRESSURE: 138 MMHG | TEMPERATURE: 97.8 F | RESPIRATION RATE: 16 BRPM | WEIGHT: 171 LBS | BODY MASS INDEX: 31.28 KG/M2

## 2019-03-12 DIAGNOSIS — M77.11 LATERAL EPICONDYLITIS OF RIGHT ELBOW: ICD-10-CM

## 2019-03-12 PROCEDURE — 99213 OFFICE O/P EST LOW 20 MIN: CPT | Performed by: PREVENTIVE MEDICINE

## 2019-03-12 ASSESSMENT — ENCOUNTER SYMPTOMS
TINGLING: 0
SENSORY CHANGE: 0

## 2019-03-12 NOTE — LETTER
[address],   [address] [place] [address]  Phone:  [phone] - Fax:  [phone]   Occupational Health [facility] Progress Report and Disability Certification  Date of Service: 3/12/2019   No Show:  No  Date / Time of Next Visit: 4/2/2019   Claim Information   Patient Name: Presley Stephenson  Claim Number:     Employer: [employer]  Date of Injury: 10/11/2018     Insurer / TPA: Ange Scott  ID / SSN:     Occupation:    Diagnosis: The encounter diagnosis was Lateral epicondylitis of right elbow.    Medical Information   Related to Industrial Injury? Yes    Subjective Complaints:  DOI 10/11/2018: 39 yo female presents with right elbow injury.  She was moving a cover when a coworker bumped her and she hit her right elbow.  Claim was denied originally but was overturned.  Patient states that she was told by her case  that her care was transferred to Dr. Lunsford however she received another call stating that he was not seeing her and that her care was transferred back here.  He had recommended hand therapy at [facility] and possible corticosteroid injection if that failed.  She states that she has been to the [facility] for 3 weeks and is use steroid patches which she had bad side effects such as chest pain, nausea and dizziness.  She states that she does not feel that the therapy is helping at all over there.  She did note that she had started therapy at [facility] which did seem to be gradually helping her symptoms.  She would like to return there for treatment, she spoke to her case  about this and was advised to come here as soon as possible.  Patient states pain continues to be on the lateral elbow.  Pain is worse than before.  Patient does not want to return back to Dr. Valdes   Objective Findings: Right elbow: No gross deformity. Tenderness over the lateral epicondyle.  Full range  of motion.  Moderate pain with resisted supination.  Strength grossly intact   Pre-Existing Condition(s):     Assessment:   Condition Same    Status: Additional Care Required  Permanent Disability:No    Plan:      Diagnostics:      Comments:  Given this is a nonsurgical issue recommend second opinion Dr. Herndon, sports medicine physician  Referral back to [facility] physical therapy 4 weeks, twice per week  Restricted duty  Follow-up 3 weeks    Disability Information   Status: Released to Restricted Duty    From:  3/12/2019  Through: 4/2/2019 Restrictions are: Temporary   Physical Restrictions   Sitting:    Standing:    Stooping:    Bending:      Squatting:    Walking:    Climbing:    Pushing:      Pulling:    Other:    Reaching Above Shoulder (L):   Reaching Above Shoulder (R):       Reaching Below Shoulder (L):    Reaching Below Shoulder (R):      Not to exceed Weight Limits   Carrying(hrs):   Weight Limit(lb):   Lifting(hrs):   Weight  Limit(lb):     Comments: Limit right arm to less than 20 lbs lift/push/pull.     Repetitive Actions   Hands: i.e. Fine Manipulations from Grasping:     Feet: i.e. Operating Foot Controls:     Driving / Operate Machinery:     Physician Name: Dung Tomlin D.O. Physician Signature: marielos-SignTAYLDUNG CRUZ D.O. e-Signature: Dr. Manuel Oneill, Medical Director   Clinic Name / Location: [address],   [address] [address] Clinic Phone Number: Dept: [phone]   Appointment Time: 4:45 Pm Visit Start Time: 4:41 PM   Check-In Time:  4:36 Pm Visit Discharge Time:  5:02PM   Original-Treating Physician or Chiropractor    Page 2-Insurer/TPA    Page 3-Employer    Page 4-Employee

## 2019-03-13 NOTE — PROGRESS NOTES
Subjective:      Presley Stephenson is a 38 y.o. female who presents with Other (WC DOI 10/11/18 Rt elbow, feeling the same room 16)      DOI 10/11/2018: 37 yo female presents with right elbow injury.  She was moving a cover when a coworker bumped her and she hit her right elbow.  Claim was denied originally but was overturned.  Patient states that she was told by her case  that her care was transferred to Dr. Lunsford however she received another call stating that he was not seeing her and that her care was transferred back here.  He had recommended hand therapy at [facility] and possible corticosteroid injection if that failed.  She states that she has been to the [place] [facility] therapy for 3 weeks and is use steroid patches which she had bad side effects such as chest pain, nausea and dizziness.  She states that she does not feel that the therapy is helping at all over there.  She did note that she had started therapy at [facility] which did seem to be gradually helping her symptoms.  She would like to return there for treatment, she spoke to her case  about this and was advised to come here as soon as possible.  Patient states pain continues to be on the lateral elbow.  Pain is worse than before.  Patient does not want to return back to Dr. Valdes     HPI    Review of Systems   Skin: Negative for itching and rash.   Neurological: Negative for tingling and sensory change.     SOCHX: Works as a  at [employer]   FH: No pertinent family history to this problem.     Objective:     /80   Pulse (!) 55   Temp 36.6 °C (97.8 °F)   Resp 16   Wt 77.6 kg (171 lb)   SpO2 94%   BMI 31.28 kg/m²      Physical Exam   Constitutional: She is oriented to person, place, and time. She appears well-developed and well-nourished.   Cardiovascular: Normal rate.    Pulmonary/Chest: Effort normal.   Neurological: She is alert and oriented to person, place, and time.   Skin: Skin is warm  and dry.   Psychiatric: She has a normal mood and affect. Judgment normal.       Right elbow: No gross deformity. Tenderness over the lateral epicondyle.  Full range of motion. Pain with resisted supination. strength intact.       Assessment/Plan:     1. Lateral epicondylitis of right elbow  Given this is a nonsurgical issue recommend second opinion Dr. Herndon, sports medicine physician  Referral back to [facility] physical therapy 4 weeks, twice per week  Restricted duty  Follow-up 3 weeks

## 2019-04-02 ENCOUNTER — OCCUPATIONAL MEDICINE (OUTPATIENT)
Dept: OCCUPATIONAL MEDICINE | Facility: CLINIC | Age: 39
End: 2019-04-02
Payer: COMMERCIAL

## 2019-04-02 VITALS
HEIGHT: 62 IN | BODY MASS INDEX: 31.28 KG/M2 | HEART RATE: 70 BPM | WEIGHT: 170 LBS | OXYGEN SATURATION: 96 % | SYSTOLIC BLOOD PRESSURE: 114 MMHG | DIASTOLIC BLOOD PRESSURE: 70 MMHG | TEMPERATURE: 98 F

## 2019-04-02 DIAGNOSIS — M77.11 LATERAL EPICONDYLITIS OF RIGHT ELBOW: ICD-10-CM

## 2019-04-02 PROCEDURE — 99212 OFFICE O/P EST SF 10 MIN: CPT | Performed by: PREVENTIVE MEDICINE

## 2019-04-02 NOTE — PROGRESS NOTES
"Subjective:      Presley Stephenson is a 38 y.o. female who presents with Other (WC FV DOI 10/11/18 Rt elbow, same, rm 17)      DOI 10/11/2018: 37 yo female presents with right elbow injury.  She was moving a cover when a coworker bumped her and she hit her right elbow.  Claim was denied originally but was overturned.  Patient states that overall right elbow is about the same.  She has been going to [facility] therapy, however she states that she feels she got better improvement over at [facility] and the like to return there.  Referral was placed at last visit for this but is yet to be approved.     HPI    ROS       Objective:     /70   Pulse 70   Temp 36.7 °C (98 °F)   Ht 1.575 m (5' 2\")   Wt 77.1 kg (170 lb)   SpO2 96%   BMI 31.09 kg/m²      Physical Exam    Right elbow: No gross deformity. Tenderness over the lateral epicondyle.  Full range of motion. Pain with resisted supination. strength intact.       Assessment/Plan:     1. Lateral epicondylitis of right elbow  Referral to sports medicine, prefer Dr. Herndon for second opinion pending  Referral for more physical therapy visits with [facility] pending  Meloxicam as needed  Restricted duty  Follow-up 3 weeks      "

## 2019-04-02 NOTE — LETTER
[address],   [address] [place] [address]  Phone:  [phone] - Fax:  [phone]   Occupational Health [facility] Progress Report and Disability Certification  Date of Service: 4/2/2019   No Show:  No  Date / Time of Next Visit: 4/30/2019 @1100am   Claim Information   Patient Name: Presley Stephenson  Claim Number:     Employer: [employer]  Date of Injury: 10/11/2018     Insurer / TPA: Ange Scott  ID / SSN:     Occupation:    Diagnosis: The encounter diagnosis was Lateral epicondylitis of right elbow.    Medical Information   Related to Industrial Injury? Yes    Subjective Complaints:  DOI 10/11/2018: 37 yo female presents with right elbow injury.  She was moving a cover when a coworker bumped her and she hit her right elbow.  Claim was denied originally but was overturned.  Patient states that overall right elbow is about the same.  She has been going to [facility] therapy, however she states that she feels she got better improvement over at [facility] and the like to return there.  Referral was placed at last visit for this but is yet to be approved.   Objective Findings: Right elbow: No gross deformity. Tenderness over the lateral epicondyle.  Full range of motion. Pain with resisted supination. strength intact.   Pre-Existing Condition(s):     Assessment:   Condition Same    Status: Additional Care Required  Permanent Disability:No    Plan:      Diagnostics:      Comments:  Referral to sports medicine, for second opinion pending  Referral for more physical therapy visits with [facility] pending  Meloxicam as needed  Restricted duty  Follow-up 3 weeks    Disability Information   Status: Released to Restricted Duty    From:  4/2/2019  Through: 4/30/2019 Restrictions are: Temporary   Physical Restrictions   Sitting:    Standing:    Stooping:    Bending:      Squatting:    Walking:    Climbing:     Pushing:      Pulling:    Other:    Reaching Above Shoulder (L):   Reaching Above Shoulder (R):       Reaching Below Shoulder (L):    Reaching Below Shoulder (R):      Not to exceed Weight Limits   Carrying(hrs):   Weight Limit(lb):   Lifting(hrs):   Weight  Limit(lb):     Comments: Limit right arm to less than 20 lbs lift/push/pull.     Repetitive Actions   Hands: i.e. Fine Manipulations from Grasping:     Feet: i.e. Operating Foot Controls:     Driving / Operate Machinery:     Physician Name: Dung Tomlin D.O. Physician Signature: DUNG Rodrigez D.O. e-Signature: Dr. Yury Chase, Medical Director   Clinic Name / Location: [address],   [address] [place] [address] Clinic Phone Number: Dept: [phone]   Appointment Time: 1:00 Pm Visit Start Time: 10:58 AM   Check-In Time:  10:52 Am Visit Discharge Time:  1124am   Original-Treating Physician or Chiropractor    Page 2-Insurer/TPA    Page 3-Employer    Page 4-Employee

## 2019-04-29 ENCOUNTER — TELEPHONE (OUTPATIENT)
Dept: OCCUPATIONAL MEDICINE | Facility: CLINIC | Age: 39
End: 2019-04-29

## 2019-04-30 ENCOUNTER — OCCUPATIONAL MEDICINE (OUTPATIENT)
Dept: OCCUPATIONAL MEDICINE | Facility: CLINIC | Age: 39
End: 2019-04-30
Payer: COMMERCIAL

## 2019-04-30 VITALS
SYSTOLIC BLOOD PRESSURE: 106 MMHG | HEIGHT: 62 IN | BODY MASS INDEX: 31.28 KG/M2 | HEART RATE: 60 BPM | WEIGHT: 170 LBS | RESPIRATION RATE: 16 BRPM | DIASTOLIC BLOOD PRESSURE: 70 MMHG | OXYGEN SATURATION: 98 % | TEMPERATURE: 97.9 F

## 2019-04-30 DIAGNOSIS — M77.11 LATERAL EPICONDYLITIS OF RIGHT ELBOW: ICD-10-CM

## 2019-04-30 PROCEDURE — 99213 OFFICE O/P EST LOW 20 MIN: CPT | Performed by: PREVENTIVE MEDICINE

## 2019-04-30 ASSESSMENT — ENCOUNTER SYMPTOMS
SENSORY CHANGE: 0
TINGLING: 0

## 2019-04-30 NOTE — LETTER
[address],   [address] [place] [address]  Phone:  [phone] - Fax:  [phone]   Occupational Health [facility] Progress Report and Disability Certification  Date of Service: 4/30/2019   No Show:  No  Date / Time of Next Visit: 5/28/2019 @ 11AM   Claim Information   Patient Name: Presley Stephenson  Claim Number:     Employer: [employer]  Date of Injury: 10/11/2018     Insurer / TPA: Ange Scott  ID / SSN:     Occupation:    Diagnosis: The encounter diagnosis was Lateral epicondylitis of right elbow.    Medical Information   Related to Industrial Injury? Yes    Subjective Complaints:  DOI 10/11/2018: 37 yo female presents with right elbow injury.  She was moving a cover when a coworker bumped her and she hit her right elbow.  Claim was denied originally but was overturned.  Patient states overall symptoms about the same at the right elbow.  Continues have pain with certain movements of the arm and with forceful gripping and grasping.  Patient states that she still has not seen another specialist or had more physical therapy.  She states she has a  now and they have not heard from her case .  Patient states she would like to go back to the gym but was wondering about doing that with Worker's Comp.   Objective Findings: Right elbow: No gross deformity. Tenderness over the lateral epicondyle.  Full range of motion. Pain with resisted supination and forceful gripping. strength intact.   Pre-Existing Condition(s):     Assessment:   Condition Same    Status: Additional Care Required  Permanent Disability:No    Plan:      Diagnostics:      Comments:  Sports medicine referral pending  Physical therapy pending  Advice patient is okay to go to the gym to work out as long as she follows restrictions at the gym as well as at work.  Continue restricted duty  Follow up 4 weeks    Disability Information   Status:  Released to Restricted Duty    From:  4/30/2019  Through: 5/28/2019 Restrictions are:     Physical Restrictions   Sitting:    Standing:    Stooping:    Bending:      Squatting:    Walking:    Climbing:    Pushing:      Pulling:    Other:    Reaching Above Shoulder (L):   Reaching Above Shoulder (R):       Reaching Below Shoulder (L):    Reaching Below Shoulder (R):      Not to exceed Weight Limits   Carrying(hrs):   Weight Limit(lb):   Lifting(hrs):   Weight  Limit(lb):     Comments: Limit right arm to less than 20 lbs lift/push/pull.     Repetitive Actions   Hands: i.e. Fine Manipulations from Grasping:     Feet: i.e. Operating Foot Controls:     Driving / Operate Machinery:     Physician Name: Dung Tomlin D.O. Physician Signature: DUNG Rodrigez D.O. e-Signature: Dr. Yury Chase, Medical Director   Clinic Name / Location: [address],   [address] [place] [address] Clinic Phone Number: Dept: [phone]   Appointment Time: 11:00 Am Visit Start Time: 10:55 AM   Check-In Time:  10:49 Am Visit Discharge Time: 11:27 AM   Original-Treating Physician or Chiropractor    Page 2-Insurer/TPA    Page 3-Employer    Page 4-Employee

## 2019-04-30 NOTE — PROGRESS NOTES
"Subjective:      Presley Stephenson is a 38 y.o. female who presents with Follow-Up (WC FV DOI 10/11/18 Rt elbow, same, rm 18)      DOI 10/11/2018: 37 yo female presents with right elbow injury.  She was moving a cover when a coworker bumped her and she hit her right elbow.  Claim was denied originally but was overturned.  Patient states overall symptoms about the same at the right elbow.  Continues have pain with certain movements of the arm and with forceful gripping and grasping.  Patient states that she still has not seen another specialist or had more physical therapy.  She states she has a  now and they have not heard from her case .  Patient states she would like to go back to the gym but was wondering about doing that with Worker's Comp.     HPI    Review of Systems   Neurological: Negative for tingling and sensory change.     SOCHX: Works as an  at [employer]   FH: No pertinent family history to this problem.       Objective:     /70   Pulse 60   Temp 36.6 °C (97.9 °F)   Resp 16   Ht 1.575 m (5' 2\")   Wt 77.1 kg (170 lb)   SpO2 98%   BMI 31.09 kg/m²      Physical Exam   Constitutional: She is oriented to person, place, and time. She appears well-developed and well-nourished.   Cardiovascular: Normal rate.    Pulmonary/Chest: Effort normal.   Neurological: She is alert and oriented to person, place, and time.   Skin: Skin is warm and dry.   Psychiatric: She has a normal mood and affect. Judgment normal.       Right elbow: No gross deformity. Tenderness over the lateral epicondyle.  Full range of motion. Pain with resisted supination and forceful gripping. strength intact.       Assessment/Plan:     1. Lateral epicondylitis of right elbow  Sports medicine referral pending  Physical therapy pending  Advice patient is okay to go to the gym to work out as long as she follows restrictions at the gym as well as at work.  Continue restricted duty  Follow up 4 " weeks

## 2019-05-24 ENCOUNTER — TELEPHONE (OUTPATIENT)
Dept: OCCUPATIONAL MEDICINE | Facility: CLINIC | Age: 39
End: 2019-05-24

## 2019-05-29 ENCOUNTER — OCCUPATIONAL MEDICINE (OUTPATIENT)
Dept: OCCUPATIONAL MEDICINE | Facility: CLINIC | Age: 39
End: 2019-05-29
Payer: COMMERCIAL

## 2019-05-29 VITALS
HEART RATE: 74 BPM | HEIGHT: 62 IN | BODY MASS INDEX: 31.28 KG/M2 | DIASTOLIC BLOOD PRESSURE: 82 MMHG | SYSTOLIC BLOOD PRESSURE: 120 MMHG | OXYGEN SATURATION: 98 % | TEMPERATURE: 97.6 F | WEIGHT: 170 LBS | RESPIRATION RATE: 14 BRPM

## 2019-05-29 DIAGNOSIS — M77.11 LATERAL EPICONDYLITIS OF RIGHT ELBOW: ICD-10-CM

## 2019-05-29 PROCEDURE — 99213 OFFICE O/P EST LOW 20 MIN: CPT | Performed by: PREVENTIVE MEDICINE

## 2019-05-29 ASSESSMENT — ENCOUNTER SYMPTOMS
SENSORY CHANGE: 0
TINGLING: 0

## 2019-05-29 NOTE — LETTER
[address],   [address] [place] [address]  Phone:  [phone] - Fax:  [phone]   Occupational Health [facility] Progress Report and Disability Certification  Date of Service: 5/29/2019   No Show:  No  Date / Time of Next Visit:  As needed   Claim Information   Patient Name: Presley Stephenson  Claim Number:     Employer: [employer]  Date of Injury: 10/11/2018     Insurer / TPA: Ange Scott  ID / SSN:     Occupation:    Diagnosis: The encounter diagnosis was Lateral epicondylitis of right elbow.    Medical Information   Related to Industrial Injury?      Subjective Complaints:  DOI 10/11/2018: 37 yo female presents with right elbow injury.  She was moving a cover when a coworker bumped her and she hit her right elbow.  Claim was denied originally but was overturned. Patient states overall symptoms are about the same.  Pain continues to be on the lateral elbow.  Increased pain with frequent use of the elbow.  She is working with her  currently and is still waiting for approval for sports medicine referral and physical therapy and is waiting to get that scheduled.   Objective Findings: Right elbow: No gross deformity.  Mild tenderness over the lateral epicondyle.  Full range of motion. Pain with resisted supination and forceful gripping. Strength grossly intact.   Pre-Existing Condition(s):     Assessment:   Condition Same    Status: Additional Care Required  Permanent Disability:No    Plan:      Diagnostics:      Comments:  Sports medicine referral pending  Physical therapy pending  Transfer care to sports medicine when approved  Continue restricted duty  Follow up as needed    Disability Information   Status: Released to Restricted Duty    From:  5/29/2019  Through:   Restrictions are: Temporary   Physical Restrictions   Sitting:    Standing:    Stooping:    Bending:      Squatting:    Walking:    Climbing:   Pushing:      Pulling:    Other:    Reaching Above Shoulder (L):   Reaching Above Shoulder (R):       Reaching Below Shoulder (L):    Reaching Below Shoulder (R):      Not to exceed Weight Limits   Carrying(hrs):   Weight Limit(lb):   Lifting(hrs):   Weight  Limit(lb):     Comments: Limit right arm to less than 20 lbs lift/push/pull.  Restrictions in place for 6 weeks from today or until cleared by sports medicine    Repetitive Actions   Hands: i.e. Fine Manipulations from Grasping:     Feet: i.e. Operating Foot Controls:     Driving / Operate Machinery:     Physician Name: Dung Tomlin D.O. Physician Signature: DUNG Rodrigez D.O. e-Signature: Dr. Yury Chase, Medical Director   Clinic Name / Location: [address],   [address] [address] Clinic Phone Number: Dept: [phone]   Appointment Time: 3:45 Pm Visit Start Time: 3:42 PM   Check-In Time:  3:39 Pm Visit Discharge Time:  0413pm   Original-Treating Physician or Chiropractor    Page 2-Insurer/TPA    Page 3-Employer    Page 4-Employee

## 2019-05-29 NOTE — PROGRESS NOTES
"Subjective:      Presley Stephenson is a 38 y.o. female who presents with Follow-Up (WC DOI 10/11/18 (R) elbow- feeling worse)      DOI 10/11/2018: 39 yo female presents with right elbow injury.  She was moving a cover when a coworker bumped her and she hit her right elbow.  Claim was denied originally but was overturned. Patient states overall symptoms are about the same.  Pain continues to be on the lateral elbow.  Increased pain with frequent use of the elbow.  She is working with her  currently and is still waiting for approval for sports medicine referral and physical therapy and is waiting to get that scheduled.     HPI    Review of Systems   Neurological: Negative for tingling and sensory change.     SOCHX: Works as an  at [employer]   FH: No pertinent family history to this problem.     Objective:     /82   Pulse 74   Temp 36.4 °C (97.6 °F)   Resp 14   Ht 1.575 m (5' 2\")   Wt 77.1 kg (170 lb)   SpO2 98%   BMI 31.09 kg/m²      Physical Exam   Constitutional: She is oriented to person, place, and time. She appears well-developed and well-nourished.   Cardiovascular: Normal rate.    Pulmonary/Chest: Effort normal.   Neurological: She is alert and oriented to person, place, and time.   Skin: Skin is warm and dry.   Psychiatric: She has a normal mood and affect. Her behavior is normal. Judgment normal.       Right elbow: No gross deformity.  Mild tenderness over the lateral epicondyle.  Full range of motion. Pain with resisted supination and forceful gripping. Strength grossly intact.       Assessment/Plan:     1. Lateral epicondylitis of right elbow  Sports medicine referral pending  Physical therapy pending  Transfer care to sports medicine when approved  Continue restricted duty  Follow up as needed      "

## 2019-08-12 ENCOUNTER — TELEPHONE (OUTPATIENT)
Dept: OCCUPATIONAL MEDICINE | Facility: CLINIC | Age: 39
End: 2019-08-12

## 2019-08-13 ENCOUNTER — OCCUPATIONAL MEDICINE (OUTPATIENT)
Dept: OCCUPATIONAL MEDICINE | Facility: CLINIC | Age: 39
End: 2019-08-13
Payer: COMMERCIAL

## 2019-08-13 VITALS
HEIGHT: 62 IN | WEIGHT: 174 LBS | HEART RATE: 68 BPM | TEMPERATURE: 98.6 F | SYSTOLIC BLOOD PRESSURE: 122 MMHG | OXYGEN SATURATION: 98 % | BODY MASS INDEX: 32.02 KG/M2 | DIASTOLIC BLOOD PRESSURE: 68 MMHG

## 2019-08-13 DIAGNOSIS — M77.11 LATERAL EPICONDYLITIS OF RIGHT ELBOW: ICD-10-CM

## 2019-08-13 PROCEDURE — 99212 OFFICE O/P EST SF 10 MIN: CPT | Performed by: PREVENTIVE MEDICINE

## 2019-08-13 NOTE — PROGRESS NOTES
"Subjective:      Presley Stephenson is a 38 y.o. female who presents with Other (WC DOI 10/11/18 (R) elbow- feeling the same room 16)      DOI 10/11/2018: 37 yo female presents with right elbow injury.  She was moving a cover when a coworker bumped her and she hit her right elbow.  Claim was denied originally but was overturned.  Patient states overall symptoms about the same.  However she got approval to see another specialist and had an appointment with them.  She cannot remember the name.  However she states the plan is to get an MRI and then do treatments after that.  She states this is her last approved appointment here at this clinic.     HPI    ROS       Objective:     /68   Pulse 68   Temp 37 °C (98.6 °F)   Ht 1.575 m (5' 2\")   Wt 78.9 kg (174 lb)   SpO2 98%   BMI 31.83 kg/m²      Physical Exam    Right elbow: No gross deformity.  Mild tenderness over the lateral epicondyle.  Full range of motion.       Assessment/Plan:     1. Lateral epicondylitis of right elbow  Transfer care  Continue restricted duty  Follow up as needed      "

## 2019-08-13 NOTE — LETTER
[address],   [address] [place] [address]  Phone:  [phone] - Fax:  [phone]   Occupational Health [facility] Progress Report and Disability Certification  Date of Service: 8/13/2019   No Show:  No  Date / Time of Next Visit:  [facility]   Claim Information   Patient Name: Presley Stephenson  Claim Number:     Employer: [employer]  Date of Injury: 10/11/2018     Insurer / TPA: Ange Scott  ID / SSN:     Occupation:    Diagnosis: The encounter diagnosis was Lateral epicondylitis of right elbow.    Medical Information   Related to Industrial Injury? No    Subjective Complaints:  DOI 10/11/2018: 39 yo female presents with right elbow injury.  She was moving a cover when a coworker bumped her and she hit her right elbow.  Claim was denied originally but was overturned.  Patient states overall symptoms about the same.  However she got approval to see another specialist and had an appointment with them.  She cannot remember the name.  However she states the plan is to get an MRI and then do treatments after that.  She states this is her last approved appointment here at this clinic.   Objective Findings: Right elbow: No gross deformity.  Mild tenderness over the lateral epicondyle.  Full range of motion.   Pre-Existing Condition(s):     Assessment:   Condition Same    Status: Discharged / Care Transfer  Permanent Disability:No    Plan:      Diagnostics:      Comments:  Transfer care  Continue restricted duty  Follow up as needed    Disability Information   Status: Released to Restricted Duty    From:  8/13/2019  Through:   Restrictions are:     Physical Restrictions   Sitting:    Standing:    Stooping:    Bending:      Squatting:    Walking:    Climbing:    Pushing:      Pulling:    Other:    Reaching Above Shoulder (L):   Reaching Above Shoulder (R):       Reaching Below Shoulder (L):    Reaching Below Shoulder (R):         Not to exceed Weight Limits   Carrying(hrs):   Weight Limit(lb):   Lifting(hrs):   Weight  Limit(lb):     Comments: Limit right arm to less than 20 lbs lift/push/pull.  Restrictions in place for 6 weeks from today or until cleared by sports medicine     Repetitive Actions   Hands: i.e. Fine Manipulations from Grasping:     Feet: i.e. Operating Foot Controls:     Driving / Operate Machinery:     Physician Name: Dung Dubon D.O. Physician Signature: messiSignDUNG DUBON D.O. e-Signature: Dr. Yury Chase, Medical Director   Clinic Name / Location: [address],   [address] [place] [address] Clinic Phone Number: Dept: [phone]   Appointment Time: 11:45 Am Visit Start Time: 11:35 AM   Check-In Time:  11:32 Am Visit Discharge Time: 11:51 AM   Original-Treating Physician or Chiropractor    Page 2-Insurer/TPA    Page 3-Employer    Page 4-Employee

## 2020-01-09 ENCOUNTER — HOSPITAL ENCOUNTER (OUTPATIENT)
Dept: LAB | Facility: MEDICAL CENTER | Age: 40
End: 2020-01-09
Attending: OBSTETRICS & GYNECOLOGY
Payer: COMMERCIAL

## 2020-01-09 PROCEDURE — 87591 N.GONORRHOEAE DNA AMP PROB: CPT

## 2020-01-09 PROCEDURE — 87491 CHLMYD TRACH DNA AMP PROBE: CPT

## 2020-01-09 PROCEDURE — 84443 ASSAY THYROID STIM HORMONE: CPT

## 2020-01-09 PROCEDURE — 36415 COLL VENOUS BLD VENIPUNCTURE: CPT

## 2020-01-09 PROCEDURE — 84439 ASSAY OF FREE THYROXINE: CPT

## 2020-01-10 LAB
C TRACH DNA SPEC QL NAA+PROBE: NEGATIVE
N GONORRHOEA DNA SPEC QL NAA+PROBE: NEGATIVE
SPECIMEN SOURCE: NORMAL
T4 FREE SERPL-MCNC: 1.07 NG/DL (ref 0.53–1.43)
TSH SERPL DL<=0.005 MIU/L-ACNC: 1.27 UIU/ML (ref 0.38–5.33)

## 2020-02-21 ENCOUNTER — ANESTHESIA EVENT (OUTPATIENT)
Dept: SURGERY | Facility: MEDICAL CENTER | Age: 40
End: 2020-02-21
Payer: COMMERCIAL

## 2020-02-21 DIAGNOSIS — Z01.812 PRE-OPERATIVE LABORATORY EXAMINATION: ICD-10-CM

## 2020-02-21 PROBLEM — E66.9 OBESITY: Chronic | Status: ACTIVE | Noted: 2020-02-21

## 2020-02-21 LAB
ANION GAP SERPL CALC-SCNC: 9 MMOL/L (ref 0–11.9)
APPEARANCE UR: CLEAR
BASOPHILS # BLD AUTO: 0.4 % (ref 0–1.8)
BASOPHILS # BLD: 0.03 K/UL (ref 0–0.12)
BILIRUB UR QL STRIP.AUTO: NEGATIVE
BUN SERPL-MCNC: 10 MG/DL (ref 8–22)
CALCIUM SERPL-MCNC: 8.8 MG/DL (ref 8.5–10.5)
CHLORIDE SERPL-SCNC: 109 MMOL/L (ref 96–112)
CO2 SERPL-SCNC: 23 MMOL/L (ref 20–33)
COLOR UR: YELLOW
CREAT SERPL-MCNC: 0.83 MG/DL (ref 0.5–1.4)
EOSINOPHIL # BLD AUTO: 0.17 K/UL (ref 0–0.51)
EOSINOPHIL NFR BLD: 2.4 % (ref 0–6.9)
ERYTHROCYTE [DISTWIDTH] IN BLOOD BY AUTOMATED COUNT: 42.1 FL (ref 35.9–50)
GLUCOSE SERPL-MCNC: 90 MG/DL (ref 65–99)
GLUCOSE UR STRIP.AUTO-MCNC: NEGATIVE MG/DL
HCG SERPL QL: NEGATIVE
HCT VFR BLD AUTO: 42.4 % (ref 37–47)
HGB BLD-MCNC: 13.5 G/DL (ref 12–16)
IMM GRANULOCYTES # BLD AUTO: 0.01 K/UL (ref 0–0.11)
IMM GRANULOCYTES NFR BLD AUTO: 0.1 % (ref 0–0.9)
KETONES UR STRIP.AUTO-MCNC: NEGATIVE MG/DL
LEUKOCYTE ESTERASE UR QL STRIP.AUTO: NEGATIVE
LYMPHOCYTES # BLD AUTO: 2.52 K/UL (ref 1–4.8)
LYMPHOCYTES NFR BLD: 36.3 % (ref 22–41)
MCH RBC QN AUTO: 29.3 PG (ref 27–33)
MCHC RBC AUTO-ENTMCNC: 31.8 G/DL (ref 33.6–35)
MCV RBC AUTO: 92 FL (ref 81.4–97.8)
MICRO URNS: NORMAL
MONOCYTES # BLD AUTO: 0.44 K/UL (ref 0–0.85)
MONOCYTES NFR BLD AUTO: 6.3 % (ref 0–13.4)
NEUTROPHILS # BLD AUTO: 3.77 K/UL (ref 2–7.15)
NEUTROPHILS NFR BLD: 54.5 % (ref 44–72)
NITRITE UR QL STRIP.AUTO: NEGATIVE
NRBC # BLD AUTO: 0 K/UL
NRBC BLD-RTO: 0 /100 WBC
PH UR STRIP.AUTO: 5.5 [PH] (ref 5–8)
PLATELET # BLD AUTO: 198 K/UL (ref 164–446)
PMV BLD AUTO: 10.7 FL (ref 9–12.9)
POTASSIUM SERPL-SCNC: 3.9 MMOL/L (ref 3.6–5.5)
PROT UR QL STRIP: NEGATIVE MG/DL
RBC # BLD AUTO: 4.61 M/UL (ref 4.2–5.4)
RBC UR QL AUTO: NEGATIVE
SODIUM SERPL-SCNC: 141 MMOL/L (ref 135–145)
SP GR UR STRIP.AUTO: 1.01
UROBILINOGEN UR STRIP.AUTO-MCNC: 0.2 MG/DL
WBC # BLD AUTO: 6.9 K/UL (ref 4.8–10.8)

## 2020-02-21 PROCEDURE — 85025 COMPLETE CBC W/AUTO DIFF WBC: CPT

## 2020-02-21 PROCEDURE — 36415 COLL VENOUS BLD VENIPUNCTURE: CPT

## 2020-02-21 PROCEDURE — 84703 CHORIONIC GONADOTROPIN ASSAY: CPT

## 2020-02-21 PROCEDURE — 81003 URINALYSIS AUTO W/O SCOPE: CPT

## 2020-02-21 PROCEDURE — 80048 BASIC METABOLIC PNL TOTAL CA: CPT

## 2020-02-22 NOTE — ANESTHESIA PREPROCEDURE EVALUATION
"Reports history of hypotension and feeling \"difficulty breathing after anesthesia\" in past.    Relevant Problems   Other   (+) Obesity       Physical Exam    Airway   Mallampati: II  TM distance: >3 FB  Neck ROM: full       Cardiovascular - normal exam  Rhythm: regular  Rate: normal  (-) murmur     Dental - normal exam         Pulmonary - normal exam  Breath sounds clear to auscultation     Abdominal    Neurological - normal exam                 Anesthesia Plan    ASA 2       Plan - general       Airway plan will be ETT        Induction: intravenous    Postoperative Plan: Postoperative administration of opioids is intended.    Pertinent diagnostic labs and testing reviewed    Informed Consent:    Anesthetic plan and risks discussed with patient.        "

## 2020-02-24 ENCOUNTER — HOSPITAL ENCOUNTER (OUTPATIENT)
Facility: MEDICAL CENTER | Age: 40
End: 2020-02-24
Attending: OBSTETRICS & GYNECOLOGY | Admitting: OBSTETRICS & GYNECOLOGY
Payer: COMMERCIAL

## 2020-02-24 ENCOUNTER — ANESTHESIA (OUTPATIENT)
Dept: SURGERY | Facility: MEDICAL CENTER | Age: 40
End: 2020-02-24
Payer: COMMERCIAL

## 2020-02-24 VITALS
DIASTOLIC BLOOD PRESSURE: 57 MMHG | SYSTOLIC BLOOD PRESSURE: 107 MMHG | HEIGHT: 61 IN | WEIGHT: 174.16 LBS | RESPIRATION RATE: 15 BRPM | HEART RATE: 76 BPM | OXYGEN SATURATION: 96 % | TEMPERATURE: 97.8 F | BODY MASS INDEX: 32.88 KG/M2

## 2020-02-24 PROBLEM — N93.9 ABNORMAL UTERINE BLEEDING: Status: ACTIVE | Noted: 2020-02-24

## 2020-02-24 PROBLEM — R10.2 PELVIC PAIN IN FEMALE: Status: ACTIVE | Noted: 2020-02-24

## 2020-02-24 LAB
B-HCG FREE SERPL-ACNC: <5 MIU/ML
IHCGL IHCGL: NEGATIVE MIU/ML
PATHOLOGY CONSULT NOTE: NORMAL

## 2020-02-24 PROCEDURE — 160025 RECOVERY II MINUTES (STATS): Performed by: OBSTETRICS & GYNECOLOGY

## 2020-02-24 PROCEDURE — 501577 HCHG TROCAR, STEP 11MM: Performed by: OBSTETRICS & GYNECOLOGY

## 2020-02-24 PROCEDURE — 501838 HCHG SUTURE GENERAL: Performed by: OBSTETRICS & GYNECOLOGY

## 2020-02-24 PROCEDURE — 501581 HCHG TROCAR: Performed by: OBSTETRICS & GYNECOLOGY

## 2020-02-24 PROCEDURE — A9270 NON-COVERED ITEM OR SERVICE: HCPCS | Performed by: ANESTHESIOLOGY

## 2020-02-24 PROCEDURE — 160002 HCHG RECOVERY MINUTES (STAT): Performed by: OBSTETRICS & GYNECOLOGY

## 2020-02-24 PROCEDURE — 500522 HCHG ENDOSTITCH SUTURING DEVICE: Performed by: OBSTETRICS & GYNECOLOGY

## 2020-02-24 PROCEDURE — 160036 HCHG PACU - EA ADDL 30 MINS PHASE I: Performed by: OBSTETRICS & GYNECOLOGY

## 2020-02-24 PROCEDURE — 700101 HCHG RX REV CODE 250: Performed by: OBSTETRICS & GYNECOLOGY

## 2020-02-24 PROCEDURE — 700105 HCHG RX REV CODE 258: Performed by: OBSTETRICS & GYNECOLOGY

## 2020-02-24 PROCEDURE — A9270 NON-COVERED ITEM OR SERVICE: HCPCS

## 2020-02-24 PROCEDURE — 700101 HCHG RX REV CODE 250: Performed by: ANESTHESIOLOGY

## 2020-02-24 PROCEDURE — 160047 HCHG PACU  - EA ADDL 30 MINS PHASE II: Performed by: OBSTETRICS & GYNECOLOGY

## 2020-02-24 PROCEDURE — 160041 HCHG SURGERY MINUTES - EA ADDL 1 MIN LEVEL 4: Performed by: OBSTETRICS & GYNECOLOGY

## 2020-02-24 PROCEDURE — 160048 HCHG OR STATISTICAL LEVEL 1-5: Performed by: OBSTETRICS & GYNECOLOGY

## 2020-02-24 PROCEDURE — 84702 CHORIONIC GONADOTROPIN TEST: CPT

## 2020-02-24 PROCEDURE — 160009 HCHG ANES TIME/MIN: Performed by: OBSTETRICS & GYNECOLOGY

## 2020-02-24 PROCEDURE — 88307 TISSUE EXAM BY PATHOLOGIST: CPT

## 2020-02-24 PROCEDURE — 160046 HCHG PACU - 1ST 60 MINS PHASE II: Performed by: OBSTETRICS & GYNECOLOGY

## 2020-02-24 PROCEDURE — 700111 HCHG RX REV CODE 636 W/ 250 OVERRIDE (IP): Performed by: ANESTHESIOLOGY

## 2020-02-24 PROCEDURE — 700104 HCHG RX REV CODE 254: Performed by: ANESTHESIOLOGY

## 2020-02-24 PROCEDURE — 160035 HCHG PACU - 1ST 60 MINS PHASE I: Performed by: OBSTETRICS & GYNECOLOGY

## 2020-02-24 PROCEDURE — 700102 HCHG RX REV CODE 250 W/ 637 OVERRIDE(OP)

## 2020-02-24 PROCEDURE — A4338 INDWELLING CATHETER LATEX: HCPCS | Performed by: OBSTETRICS & GYNECOLOGY

## 2020-02-24 PROCEDURE — 501570 HCHG TROCAR, SEPARATOR: Performed by: OBSTETRICS & GYNECOLOGY

## 2020-02-24 PROCEDURE — 502587 HCHG PACK, D&C: Performed by: OBSTETRICS & GYNECOLOGY

## 2020-02-24 PROCEDURE — 500800 HCHG LAPAROSCOPIC J/L HOOK: Performed by: OBSTETRICS & GYNECOLOGY

## 2020-02-24 PROCEDURE — 502703 HCHG DEVICE, LIGASURE V SEALER: Performed by: OBSTETRICS & GYNECOLOGY

## 2020-02-24 PROCEDURE — 500886 HCHG PACK, LAPAROSCOPY: Performed by: OBSTETRICS & GYNECOLOGY

## 2020-02-24 PROCEDURE — 501330 HCHG SET, CYSTO IRRIG TUBING: Performed by: OBSTETRICS & GYNECOLOGY

## 2020-02-24 PROCEDURE — 501411 HCHG SPONGE, BABY LAP W/O RINGS: Performed by: OBSTETRICS & GYNECOLOGY

## 2020-02-24 PROCEDURE — 501582 HCHG TROCAR, THRD BLADED: Performed by: OBSTETRICS & GYNECOLOGY

## 2020-02-24 PROCEDURE — 700102 HCHG RX REV CODE 250 W/ 637 OVERRIDE(OP): Performed by: ANESTHESIOLOGY

## 2020-02-24 PROCEDURE — 160029 HCHG SURGERY MINUTES - 1ST 30 MINS LEVEL 4: Performed by: OBSTETRICS & GYNECOLOGY

## 2020-02-24 RX ORDER — HYDROMORPHONE HYDROCHLORIDE 1 MG/ML
0.1 INJECTION, SOLUTION INTRAMUSCULAR; INTRAVENOUS; SUBCUTANEOUS
Status: DISCONTINUED | OUTPATIENT
Start: 2020-02-24 | End: 2020-02-24 | Stop reason: HOSPADM

## 2020-02-24 RX ORDER — OXYCODONE HCL 10 MG/1
10 TABLET, FILM COATED, EXTENDED RELEASE ORAL ONCE
Status: COMPLETED | OUTPATIENT
Start: 2020-02-24 | End: 2020-02-24

## 2020-02-24 RX ORDER — KETOROLAC TROMETHAMINE 30 MG/ML
INJECTION, SOLUTION INTRAMUSCULAR; INTRAVENOUS PRN
Status: DISCONTINUED | OUTPATIENT
Start: 2020-02-24 | End: 2020-02-24 | Stop reason: SURG

## 2020-02-24 RX ORDER — DEXAMETHASONE SODIUM PHOSPHATE 4 MG/ML
INJECTION, SOLUTION INTRA-ARTICULAR; INTRALESIONAL; INTRAMUSCULAR; INTRAVENOUS; SOFT TISSUE PRN
Status: DISCONTINUED | OUTPATIENT
Start: 2020-02-24 | End: 2020-02-24 | Stop reason: SURG

## 2020-02-24 RX ORDER — HYDROMORPHONE HYDROCHLORIDE 1 MG/ML
0.4 INJECTION, SOLUTION INTRAMUSCULAR; INTRAVENOUS; SUBCUTANEOUS
Status: DISCONTINUED | OUTPATIENT
Start: 2020-02-24 | End: 2020-02-24 | Stop reason: HOSPADM

## 2020-02-24 RX ORDER — ONDANSETRON 2 MG/ML
4 INJECTION INTRAMUSCULAR; INTRAVENOUS EVERY 6 HOURS PRN
Status: DISCONTINUED | OUTPATIENT
Start: 2020-02-24 | End: 2020-02-24 | Stop reason: HOSPADM

## 2020-02-24 RX ORDER — GABAPENTIN 300 MG/1
300 CAPSULE ORAL ONCE
Status: COMPLETED | OUTPATIENT
Start: 2020-02-24 | End: 2020-02-24

## 2020-02-24 RX ORDER — OXYCODONE HCL 5 MG/5 ML
SOLUTION, ORAL ORAL
Status: COMPLETED
Start: 2020-02-24 | End: 2020-02-24

## 2020-02-24 RX ORDER — BUPIVACAINE HYDROCHLORIDE AND EPINEPHRINE 2.5; 5 MG/ML; UG/ML
INJECTION, SOLUTION EPIDURAL; INFILTRATION; INTRACAUDAL; PERINEURAL
Status: DISCONTINUED | OUTPATIENT
Start: 2020-02-24 | End: 2020-02-24 | Stop reason: HOSPADM

## 2020-02-24 RX ORDER — BUPIVACAINE HYDROCHLORIDE AND EPINEPHRINE 2.5; 5 MG/ML; UG/ML
INJECTION, SOLUTION EPIDURAL; INFILTRATION; INTRACAUDAL; PERINEURAL
Status: DISCONTINUED
Start: 2020-02-24 | End: 2020-02-24 | Stop reason: HOSPADM

## 2020-02-24 RX ORDER — LIDOCAINE HYDROCHLORIDE 40 MG/ML
SOLUTION TOPICAL PRN
Status: DISCONTINUED | OUTPATIENT
Start: 2020-02-24 | End: 2020-02-24 | Stop reason: SURG

## 2020-02-24 RX ORDER — HYDROMORPHONE HYDROCHLORIDE 1 MG/ML
0.2 INJECTION, SOLUTION INTRAMUSCULAR; INTRAVENOUS; SUBCUTANEOUS
Status: DISCONTINUED | OUTPATIENT
Start: 2020-02-24 | End: 2020-02-24 | Stop reason: HOSPADM

## 2020-02-24 RX ORDER — IBUPROFEN 400 MG/1
400 TABLET ORAL EVERY 6 HOURS PRN
Status: DISCONTINUED | OUTPATIENT
Start: 2020-02-24 | End: 2020-02-24 | Stop reason: HOSPADM

## 2020-02-24 RX ORDER — MEPERIDINE HYDROCHLORIDE 25 MG/ML
6.25 INJECTION INTRAMUSCULAR; INTRAVENOUS; SUBCUTANEOUS
Status: DISCONTINUED | OUTPATIENT
Start: 2020-02-24 | End: 2020-02-24 | Stop reason: HOSPADM

## 2020-02-24 RX ORDER — OXYCODONE HYDROCHLORIDE 5 MG/1
5 TABLET ORAL
Status: DISCONTINUED | OUTPATIENT
Start: 2020-02-24 | End: 2020-02-24 | Stop reason: HOSPADM

## 2020-02-24 RX ORDER — ACETAMINOPHEN 500 MG
1000 TABLET ORAL ONCE
Status: COMPLETED | OUTPATIENT
Start: 2020-02-24 | End: 2020-02-24

## 2020-02-24 RX ORDER — IPRATROPIUM BROMIDE AND ALBUTEROL SULFATE 2.5; .5 MG/3ML; MG/3ML
3 SOLUTION RESPIRATORY (INHALATION)
Status: DISCONTINUED | OUTPATIENT
Start: 2020-02-24 | End: 2020-02-24 | Stop reason: HOSPADM

## 2020-02-24 RX ORDER — OXYCODONE HYDROCHLORIDE 5 MG/1
2.5 TABLET ORAL
Status: DISCONTINUED | OUTPATIENT
Start: 2020-02-24 | End: 2020-02-24 | Stop reason: HOSPADM

## 2020-02-24 RX ORDER — SODIUM CHLORIDE, SODIUM LACTATE, POTASSIUM CHLORIDE, CALCIUM CHLORIDE 600; 310; 30; 20 MG/100ML; MG/100ML; MG/100ML; MG/100ML
INJECTION, SOLUTION INTRAVENOUS EVERY 6 HOURS
Status: ACTIVE | OUTPATIENT
Start: 2020-02-24 | End: 2020-02-24

## 2020-02-24 RX ORDER — ONDANSETRON 2 MG/ML
INJECTION INTRAMUSCULAR; INTRAVENOUS PRN
Status: DISCONTINUED | OUTPATIENT
Start: 2020-02-24 | End: 2020-02-24 | Stop reason: SURG

## 2020-02-24 RX ORDER — CEFAZOLIN SODIUM 1 G/3ML
INJECTION, POWDER, FOR SOLUTION INTRAMUSCULAR; INTRAVENOUS PRN
Status: DISCONTINUED | OUTPATIENT
Start: 2020-02-24 | End: 2020-02-24 | Stop reason: SURG

## 2020-02-24 RX ORDER — HALOPERIDOL 5 MG/ML
1 INJECTION INTRAMUSCULAR
Status: DISCONTINUED | OUTPATIENT
Start: 2020-02-24 | End: 2020-02-24 | Stop reason: HOSPADM

## 2020-02-24 RX ORDER — ROCURONIUM BROMIDE 10 MG/ML
INJECTION, SOLUTION INTRAVENOUS PRN
Status: DISCONTINUED | OUTPATIENT
Start: 2020-02-24 | End: 2020-02-24 | Stop reason: SURG

## 2020-02-24 RX ORDER — SODIUM CHLORIDE, SODIUM LACTATE, POTASSIUM CHLORIDE, CALCIUM CHLORIDE 600; 310; 30; 20 MG/100ML; MG/100ML; MG/100ML; MG/100ML
INJECTION, SOLUTION INTRAVENOUS CONTINUOUS
Status: DISCONTINUED | OUTPATIENT
Start: 2020-02-24 | End: 2020-02-24

## 2020-02-24 RX ORDER — MORPHINE SULFATE 4 MG/ML
2 INJECTION, SOLUTION INTRAMUSCULAR; INTRAVENOUS
Status: DISCONTINUED | OUTPATIENT
Start: 2020-02-24 | End: 2020-02-24 | Stop reason: HOSPADM

## 2020-02-24 RX ORDER — LIDOCAINE HYDROCHLORIDE 20 MG/ML
INJECTION, SOLUTION EPIDURAL; INFILTRATION; INTRACAUDAL; PERINEURAL PRN
Status: DISCONTINUED | OUTPATIENT
Start: 2020-02-24 | End: 2020-02-24 | Stop reason: SURG

## 2020-02-24 RX ORDER — ONDANSETRON 2 MG/ML
4 INJECTION INTRAMUSCULAR; INTRAVENOUS
Status: COMPLETED | OUTPATIENT
Start: 2020-02-24 | End: 2020-02-24

## 2020-02-24 RX ORDER — KETOROLAC TROMETHAMINE 30 MG/ML
30 INJECTION, SOLUTION INTRAMUSCULAR; INTRAVENOUS EVERY 6 HOURS
Status: DISCONTINUED | OUTPATIENT
Start: 2020-02-24 | End: 2020-02-24 | Stop reason: HOSPADM

## 2020-02-24 RX ORDER — MIDAZOLAM HYDROCHLORIDE 1 MG/ML
INJECTION INTRAMUSCULAR; INTRAVENOUS PRN
Status: DISCONTINUED | OUTPATIENT
Start: 2020-02-24 | End: 2020-02-24 | Stop reason: SURG

## 2020-02-24 RX ADMIN — INDIGO CARMINE 3 ML: 8 INJECTION, SOLUTION INTRAMUSCULAR; INTRAVENOUS at 12:38

## 2020-02-24 RX ADMIN — LIDOCAINE HYDROCHLORIDE 4 ML: 40 SOLUTION TOPICAL at 11:00

## 2020-02-24 RX ADMIN — OXYCODONE HYDROCHLORIDE 5 MG: 5 SOLUTION ORAL at 14:50

## 2020-02-24 RX ADMIN — SUGAMMADEX 200 MG: 100 INJECTION, SOLUTION INTRAVENOUS at 12:51

## 2020-02-24 RX ADMIN — LIDOCAINE HYDROCHLORIDE 60 MG: 20 INJECTION, SOLUTION EPIDURAL; INFILTRATION; INTRACAUDAL at 10:56

## 2020-02-24 RX ADMIN — KETOROLAC TROMETHAMINE 30 MG: 30 INJECTION, SOLUTION INTRAMUSCULAR at 12:52

## 2020-02-24 RX ADMIN — CEFAZOLIN 2 G: 330 INJECTION, POWDER, FOR SOLUTION INTRAMUSCULAR; INTRAVENOUS at 11:07

## 2020-02-24 RX ADMIN — FENTANYL CITRATE 50 MCG: 50 INJECTION, SOLUTION INTRAMUSCULAR; INTRAVENOUS at 10:55

## 2020-02-24 RX ADMIN — DEXAMETHASONE SODIUM PHOSPHATE 8 MG: 4 INJECTION, SOLUTION INTRA-ARTICULAR; INTRALESIONAL; INTRAMUSCULAR; INTRAVENOUS; SOFT TISSUE at 11:08

## 2020-02-24 RX ADMIN — FENTANYL CITRATE 25 MCG: 0.05 INJECTION, SOLUTION INTRAMUSCULAR; INTRAVENOUS at 14:51

## 2020-02-24 RX ADMIN — EPHEDRINE SULFATE 5 MG: 50 INJECTION, SOLUTION INTRAVENOUS at 11:26

## 2020-02-24 RX ADMIN — FENTANYL CITRATE 50 MCG: 50 INJECTION, SOLUTION INTRAMUSCULAR; INTRAVENOUS at 11:38

## 2020-02-24 RX ADMIN — ROCURONIUM BROMIDE 50 MG: 10 INJECTION, SOLUTION INTRAVENOUS at 10:57

## 2020-02-24 RX ADMIN — GABAPENTIN 300 MG: 300 CAPSULE ORAL at 09:26

## 2020-02-24 RX ADMIN — EPHEDRINE SULFATE 5 MG: 50 INJECTION, SOLUTION INTRAVENOUS at 12:30

## 2020-02-24 RX ADMIN — SODIUM CHLORIDE, POTASSIUM CHLORIDE, SODIUM LACTATE AND CALCIUM CHLORIDE: 600; 310; 30; 20 INJECTION, SOLUTION INTRAVENOUS at 12:40

## 2020-02-24 RX ADMIN — PROPOFOL 150 MG: 10 INJECTION, EMULSION INTRAVENOUS at 10:56

## 2020-02-24 RX ADMIN — ROCURONIUM BROMIDE 10 MG: 10 INJECTION, SOLUTION INTRAVENOUS at 11:57

## 2020-02-24 RX ADMIN — PROPOFOL 20 MG: 10 INJECTION, EMULSION INTRAVENOUS at 12:54

## 2020-02-24 RX ADMIN — FENTANYL CITRATE 50 MCG: 50 INJECTION, SOLUTION INTRAMUSCULAR; INTRAVENOUS at 12:06

## 2020-02-24 RX ADMIN — ONDANSETRON 4 MG: 2 INJECTION INTRAMUSCULAR; INTRAVENOUS at 12:22

## 2020-02-24 RX ADMIN — FENTANYL CITRATE 25 MCG: 50 INJECTION, SOLUTION INTRAMUSCULAR; INTRAVENOUS at 12:53

## 2020-02-24 RX ADMIN — ACETAMINOPHEN 1000 MG: 500 TABLET ORAL at 09:26

## 2020-02-24 RX ADMIN — SODIUM CHLORIDE, POTASSIUM CHLORIDE, SODIUM LACTATE AND CALCIUM CHLORIDE: 600; 310; 30; 20 INJECTION, SOLUTION INTRAVENOUS at 10:53

## 2020-02-24 RX ADMIN — ONDANSETRON 4 MG: 2 INJECTION INTRAMUSCULAR; INTRAVENOUS at 18:25

## 2020-02-24 RX ADMIN — OXYCODONE HYDROCHLORIDE 10 MG: 10 TABLET, FILM COATED, EXTENDED RELEASE ORAL at 09:25

## 2020-02-24 RX ADMIN — MIDAZOLAM HYDROCHLORIDE 2 MG: 1 INJECTION, SOLUTION INTRAMUSCULAR; INTRAVENOUS at 10:53

## 2020-02-24 ASSESSMENT — PAIN SCALES - GENERAL: PAIN_LEVEL: 0

## 2020-02-24 NOTE — OR SURGEON
Immediate Post OP Note    PreOp Diagnosis:  1. Abnormal Uterine Bleeding                      2. Failed Endometrial Ablation            3. Chronic Pelvic Pain    PostOp Diagnosis: same    Procedure(s):  HYSTERECTOMY, TOTAL LAPAROSCOPIC - Wound Class: Clean Contaminated  BILATERAL SALPINGECTOMY - Wound Class: Clean Contaminated  CYSTOSCOPY - Wound Class: Clean Contaminated    Surgeons:  GIOVANNI Jimenez M.D.    Anesthesiologist/Type of Anesthesia:  Anesthesiologist: Nikki Moser M.D./General    Surgical Staff:  Circulator: Ailyn Stanley R.N.  Relief Circulator: Mirna Raines R.N.  Relief Scrub: Monika Talavera R.N.  Scrub Person: Skye Del Cid; Dianne Peña    Specimens removed if any:  ID Type Source Tests Collected by Time Destination   A : Uterus, bilateral fallopian tubes Tissue Other PATHOLOGY SPECIMEN Flower Rocha M.D. 2/24/2020  9:33 AM        Estimated Blood Loss: 100 cc    Findings: uterus is slightly enlarged, tubes with evident tubal ligation                   Bilateral ovaries grossly normal        Cystoscopy shows intact bladder w/o any injury or foreign body                  Bilateral ureteral openings are patent with quick egress of indigo carmine dye    Complications: none     2/24/2020 2:17 PM Flower Rocha M.D.

## 2020-02-24 NOTE — ANESTHESIA PROCEDURE NOTES
Airway  Date/Time: 2/24/2020 11:00 AM  Performed by: Nikki Moser M.D.  Authorized by: Nikki Moser M.D.     Location:  OR  Urgency:  Elective  Difficult Airway: No    Indications for Airway Management:  Anesthesia  Spontaneous Ventilation: absent    Sedation Level:  Deep  Preoxygenated: Yes    Patient Position:  Sniffing  Mask Difficulty Assessment:  1 - vent by mask  Final Airway Type:  Endotracheal airway  Final Endotracheal Airway:  ETT  Cuffed: Yes    Technique Used for Successful ETT Placement:  Direct laryngoscopy  Devices/Methods Used in Placement:  Intubating stylet  Insertion Site:  Oral  Blade Type:  Agnes  Laryngoscope Blade/Videolaryngoscope Blade Size:  3  ETT Size (mm):  7.0  Measured from:  Teeth  ETT to Teeth (cm):  20  Placement Verified by: auscultation and capnometry    Cormack-Lehane Classification:  Grade I - full view of glottis  Number of Attempts at Approach:  2   1st attempt by MS3

## 2020-02-24 NOTE — ANESTHESIA TIME REPORT
Anesthesia Start and Stop Event Times     Date Time Event    2/24/2020 1047 Ready for Procedure     1053 Anesthesia Start     1305 Anesthesia Stop        Responsible Staff  02/24/20    Name Role Begin End    Nikki Moser M.D. Anesth 1053 1305        Preop Diagnosis (Free Text):  Pre-op Diagnosis     ABNORMAL UTERINE BLEEDING        Preop Diagnosis (Codes):    Post op Diagnosis  Abnormal uterine bleeding      Premium Reason  Non-Premium    Comments:

## 2020-02-24 NOTE — DISCHARGE INSTRUCTIONS
Instrucciones Para [place]  (Home Care Instructions)    ACTIVIDAD: Descanse y tome todo con mucha calma las primeras 24 horas después de pathak cirugía.  Shameka persona adulta responsable debe permanecer con usted ck marek periodo de tiempo.  Es normal sentirse sonoliento o sonolienta ck esas primeras horas.  Le recomendamos que no gorge nada que requiera equilibrio, bita decisiones a mucha coordinación de pathak parte.    NO GORGE ESTO PURANTE LAS PRIMERAS 24 HORAS:   Manejar o conducir algún vehiculo, operar maquinarias o utilizar electrodomesticos.   Beber cerveza o algún otro tipo de bebida alcohólica.   Bita decisiones importantes o firmar documentos legales.    INSTRUCCIONES ESPECIALES:  PARA DOCTOR    DIETA: Para evitar las nauseas, prosiga despacito con pathak dieta a medida que pueda ir tolerándola mejor, evite comidas muy condimentadas o grasosas ck marek primer día.  Vaya agregando comidas más substanciadas a pathak dieta a medida que asi lo indique pathak médica.  Los bebés pueden beber leche preparada o formula, ásl vijay también leche del seno de la madre a medida que vayan teniendo hambre.  SIGA AGREGANDO LIQUIDOS Y COMIDAS CON FIBRA PARA EVITAR ESTREÑIMIENTO.    VIJAY BAÑARSE Y CAMBIAR LOS VENDAJES DE LA CIRUGIA: PARA DOCTOR    MEDICAMENTOS/MEDICINAS:  Vuelva a bita lindy medicamentos diarios.  [place] los medicamentos que se le prescribe con un poco de comida.  Si no le prescribe ningún tipo de medicamento, entonces puede bita medicinas para el dolor que no contienen aspirina, si las necesita.  LAS MEDICINAS PARA EL DOLOR PUEDEN ESTREÑIRLE MUCHO.  [place] un suavizante para el excremento o materia fecal (stool softener) o un laxativo vijay por ejemplo: senokot, pericolase, o leche de magnesia, si lo necesita.    La prescripción la administro NA.  La ultima sosis de medicina para el dolor fue administrada 3 pm   Se debe hacer shameka consulta medica con el doctor en ***, Líame para hacer la angie.    Usted debe LIAMAR A PATHAK  MEDICO si tiene los siguientes síntomas:   -   Kari fiebre más mane de 101 grados Fahrenheit.   -   Un dolor incesante aún con los medicamentos, o nauseas y vómito persistente.   -   Un sangrado excesivo (dean que traspasa los vendajes o gasas) o algúln tipo de drenaje inesperado que proviene de la henda.     -   Un color lowe exagerado o hinchazón alrededor del área en donde se le hizo incisión o en, o un drenaje de pus o con olor purnima proveniente de la henda.   -    La inhabilidad de orinar o vaciar pathak vejiga en 8 horas.   -    Problemas con a respiración o ru en el pecho.    Usted debe llamar al 911 si se presentan problemas con el dolor al respirar o el pecho.  Si no se puede ponnoer en comunicación con un medica o con el centro de cirugía, usted debe ir a la estación de emergencia (emergency room) más cercana o a un centro de atención de urgencia (urgent care center).  El teléfono del medico es: ***    LOS SÍNTOMAS DE UN LEVE RESFRIO SON MUY NORMALES.  ADEMÁS USTED PUEDE LLEGAR A SENTIR RU GENERALES DE MÚSCULOS, IRRITACIÓN EN LA GARGANTA, RU DE ELIJAH Y/O UN POCO DE NAUSEAS.    Sie tiene alguna pregunta, llame a pathak médico.  Si pathak médico no se encuentra disponible, por favor llame al Centro de Cirugía at {Surgical Dept Numbers:[id number]}.  el Centro está abierto de Lunes a Viernes desde las 7:00 de la manana hasta las 7:00 de la noche.  Usted también puede llamar al CENTRO DE LLAMADAS SOBRE LA HEIDY o HEALTH HOTLINE.  Sumi está abierto viente y cuatro horas por lola, siete meadows por semana, allí podrá hablar con kari enfermera.  Llame al [phone], o al número monika 8 [phone].    Mi firma a continuación indica que he recibido y entiendco estas instrucciones acera de los cuidados en la casa (Home Care Instructions)    Usted recibirá kari encuesta en la correspondencia en las siguientes semanas y le pedimos que por favor tome un momento para completar zaire encuesta y regresaría a hosotros.   Nuestro objetivó es brindarle un cuidado muy castillo y par lo tanto apreciamos lindy coméntanos.  Muchas milton por arlene escogido el Centro de Cirugía de [facility].

## 2020-02-24 NOTE — ANESTHESIA POSTPROCEDURE EVALUATION
Patient: Presley Mccallum    Procedure Summary     Date:  02/24/20 Room / Location:  [facility] ROOM 25 / SURGERY SAME DAY [facility]    Anesthesia Start:   Anesthesia Stop:      Procedures:       HYSTERECTOMY, LAPAROSCOPIC (N/A Pelvis)      SALPINGECTOMY (Bilateral Pelvis)      CYSTOSCOPY (N/A Pelvis) Diagnosis:  (ABNORMAL UTERINE BLEEDING)    Surgeon:  Flower Rocha M.D. Responsible Provider:      Anesthesia Type:  general ASA Status:  2          Final Anesthesia Type: general  Last vitals  BP   Blood Pressure: 118/60    Temp   36.6 °C (97.8 °F)    Pulse   Pulse: 85   Resp   16    SpO2   100 %      Anesthesia Post Evaluation    Patient location during evaluation: PACU  Patient participation: complete - patient participated  Level of consciousness: awake and alert  Pain score: 0    Airway patency: patent  Anesthetic complications: no  Cardiovascular status: hemodynamically stable  Respiratory status: acceptable  Hydration status: euvolemic    PONV: none

## 2020-02-24 NOTE — OR NURSING
Low BP and HR.  Pt states aware.  No prior cardiac hx.  Pt asymptomatic at this time and takes no meds; she does report hx fatigue.  Will notify Anesthesia of VS before procedure.  Raffi HOFFMANN translating for pt and pt's  today.

## 2020-02-24 NOTE — H&P
"  HISTORY AND PHYSICAL     PRE-OP DIAGNOSIS:     1. ABNORMAL UTERINE BLEEDING   2. FAILED ENDOMETRIAL ABLATION   3. CHRONIC PELVIC PAIN     PROPOSED SURGICAL PROCEDURE:    1. TOTAL LAPAROSCOPIC HYSTERECTOMY WITH BILATERAL SALPINGECTOMY  2. CYSTOSCOPY  3. ANY OTHER INDICATED PROCEDURE      HISTORY OF PRESENT ILLNESS:       Rosana is a 39 year old who comes today accompanied by her daughter  to discuss about surgical procedure     Patient is complaining of frequent heavy periods and cramping for the last 7 months .   Pt reports having menses 2 x a month, starting 8/2019, lasting 8 days each from her normal 5-6 days. It is heavy with blood clots associated with a lot of lower pelvic cramping. Intensity is 4-10/10 in the pain scale. Lately her pelvic pain is constant, dull crampy.  Pt also is complaining of post coital bleeding  denies abnormal vaginal discharge  She had just had a pap smear and pelvic US in [place] few months ago - results were WNL    Endometrial biopsy results was benign.    Pt reports history of endometrial ablation done in [place] 10 years ago.   She reports sensitivity and non-tolerance to any kind of hormonal medications like OCP and IUD Mirena that caused her to have skin rash.     Review of systems:  Pertinent positives documented in HPI and all other systems reviewed & are negative    All PMH, PSH, allergies, social history and FH reviewed and updated today:  Past Medical History:   Diagnosis Date   • Anesthesia     \"I felt like I couldn't breath after anesthesia\"    • Hypotension     \"I tend to run low\"        Past Surgical History:   Procedure Laterality Date   • TUBAL LIGATION  2011       Allergies:   Allergies   Allergen Reactions   • Shellfish Allergy      Rash        Social History     Socioeconomic History   • Marital status:      Spouse name: Not on file   • Number of children: Not on file   • Years of education: Not on file   • Highest education level: Not on file " "  Occupational History   • Not on file   Social Needs   • Financial resource strain: Not on file   • Food insecurity     Worry: Not on file     Inability: Not on file   • Transportation needs     Medical: Not on file     Non-medical: Not on file   Tobacco Use   • Smoking status: Never Smoker   • Smokeless tobacco: Never Used   Substance and Sexual Activity   • Alcohol use: No   • Drug use: No   • Sexual activity: Not on file   Lifestyle   • Physical activity     Days per week: Not on file     Minutes per session: Not on file   • Stress: Not on file   Relationships   • Social connections     Talks on phone: Not on file     Gets together: Not on file     Attends Congregational service: Not on file     Active member of club or organization: Not on file     Attends meetings of clubs or organizations: Not on file     Relationship status: Not on file   • Intimate partner violence     Fear of current or ex partner: Not on file     Emotionally abused: Not on file     Physically abused: Not on file     Forced sexual activity: Not on file   Other Topics Concern   • Not on file   Social History Narrative   • Not on file       No family history on file.    Physical exam:  Ht 1.549 m (5' 1\")   Wt 81 kg (178 lb 9.2 oz)     General:appears stated age, is in no apparent distress, is well developed and well nourished  Head: normocephalic, non-tender  Neck: neck is supple, no thyromegaly  CV : regular rate and rhythm, no peripheral edema  Lungs: Normal respiratory effort. Clear to auscultation bilaterally  Abdomen: Bowel sounds positive, nondistended, soft, nontender x4, no rebound or guarding. No organomegaly. No masses.  Female GYN: deferred   Skin: No rashes, or ulcers or lesions seen  Psychiatric: Patient shows appropriate affect, is alert and oriented x3, intact judgment and insight.      ASSESSMENT AND PLAN:    1. ABNORMAL UTERINE BLEEDING   2. FAILED ENDOMETRIAL ABLATION   3. CHRONIC PELVIC PAIN   FOR   1. TOTAL LAPAROSCOPIC " HYSTERECTOMY WITH BILATERAL SALPINGECTOMY  2. CYSTOSCOPY  3. ANY OTHER INDICATED PROCEDURE  Specific to Laparoscopic surgery , patient made aware of increased risk of trocar injuries to the intestine, major /minor blood vessels and bladder.Patient is also made aware of the slight increase risk to those organs mention from thermal ( burn) injuries. Patient is aware that the small operating arena of this type surgery may make immediate diagnosis of these injuries difficult. Thus the patient acknowledges both immediate and delayed diagnosis of these complications from this type of surgery are possible, albeit rare.     Specific to Abdominal surgery ( open), patient is made aware of risk of injury to the bowel, bladder, nerves and the ureter ( urine conduit). Complications to these organs are rare, but do occur and indeed it was discussed that the specific pathology of the patient that necessitated surgery may make the risk greater. Patient additionally is aware of the risk of subsequent adhesions or small bowel obstruction from open abdominal surgery.    Specific to BS: patient made aware that since most ovarian cyst formation post hysterectomy is related to adhesions and inflammatory reaction, that subsequent adhesions and recurrence of pain and dyspareunia may occur.  Patient also aware of increased risk of injury to bowel and bladder is present as result of adhesions and every effort including preoperative bowel prep will be used to minimize this risk                                                                                                            ________________  Specific to Hysterectomy, patient is aware that although alternative methods exist for uterine preservation, that both patient and surgeon, agree that the benefits to hysterectomy outweigh the risks and that alternative methods are not indicated or beneficial for her specific pathology ( problem) Patient is also aware that removal of the ovaries  may if not already in menopause, speed the onset of menopause, and that specific discussions regarding the risk/benefit of ovarian preservation vs removal and the use of hormone replacement have been discussed and all questions answered.    Cytoscopy will be performed after the hysterectomy, although of rare occurrence, there is always an inherent risk of damaging the urinary tract in most major gynecologic surgeries. Up to 75% of ureteric injuries are caused by gynecologic surgery and interestingly, most injuries occur during procedures for benign diseases.13 The timely detection of a urinary tract injury by cystoscopy intraoperatively allows for immediate referral and repair by a urologist or a urogynecologist during the same surgical procedure. It should be borne in mind, however, that cystoscopy is not 100% sensitive or specific. Injuries of thermal nature, secondary to devascularization or suture necrosis, can still be missed intraoperatively by cystoscopy, even with visualization of ureteric jets or an intact bladder.    All questions addressed to stated satisfaction.  Pre-op instructions given.     ARTURO MORRIS MD
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
## 2020-02-24 NOTE — OP REPORT
OPERATIVE REPORT       PROCEDURE:                1. TOTAL LAPAROSCOPIC HYSTERECTOMY WITH BILATERAL SALPINGECTOMY   2. CYSTOSCOPY                DIAGNOSES:   1. ABNORMAL UTERINE BLEEDING              2. FAILED ENDOMETRIAL ABLATION               3. CHRONIC PELVIC PAIN    SURGEONS:                Flower Mcnulty MD                ANESTHESIOLOGIST/TYPE OF ANESTHESIA: Nikki Moser MD/General      SPECIMEN: Uterus, bilateral fallopian tubes      EBL:  100 cc                                        UO:  Clear post-procedure    Findings: uterus is slightly enlarged, tubes with evident tubal ligation                   Bilateral ovaries grossly normal                   Cystoscopy shows intact bladder w/o any injury or foreign body                  Bilateral ureteral openings are patent with quick egress of indigo carmine dye     COMPLICATIONS: none                    Patient condition: stable to PACU     Description of the Procedure:        After informed consents were obtained, patient was taken to the operating room where general endotracheal anesthesia was administered without difficulty.  She was then prepped and draped in a normal usual sterile fashion in the lithotomy position with José Manuel stirrups.  A formal time-out was called prior to the procedure and preoperative   antibiotics were given.  Examination under anesthesia was performed.  Moseley   catheter was placed under sterile technique.  V-care uterine manipulator was   placed without any difficulty and the uterus sounded to 10 cm.  The legs were   then deflexed and preparation for the abdominal procedure.  At the umbilical   fold, a 5 mm skin incision was performed after local Marcaine with epinephrine  was infiltrated. Laparoscope was inserted under direct visualization using the Opti-Floridalma   The abdomen was insufflated with CO2 gas at a pressure of 14 mmHg and   pneumoperitoneum was maintained at approximately 3.5 liters of CO2  gas.    Bilateral 5 mm ports approximately 2 cm medial and superior to the anterior   superior iliac spine were then placed under direct visualization.  Survey of   the pelvis and abdomen was performed as noted above.     Starting on the left side, the fallopian tube was  from the ovary   with bipolar cautery to be removed with the remainder of the specimen.  The   left utero-ovarian ligament was grasped with bipolar cautery, cauterized and cut with  excellent hemostasis.  The round ligament on the left was then grasped with   bipolar cautery, cauterized and cut with excellent hemostasis.  Anterior leaf   of the broad ligament was incised along the bladder reflection to the midline   and the uterine arteries were then skeletonized, grasped with Ligasure bipolar cautery,  cauterized and transected with bipolar cautery with excellent hemostasis   noted.  Dr Mcnulty: The procedure went towards the patient's right and the right fallopian   tube was  from the ovary with bipolar and transected.  Hemostasis is   noted.  The right uteroovarian ligament was grasped with bipolar cautery,   cauterized and cut followed by the round ligament, which was then grasped with  Ligasure bipolar, cauterized and cut.  The anterior leaf of the right broad ligament was  incised along the bladder reflection to the midline and the uterine arteries   were then skeletonized and grasped with bipolar cautery, cauterized and   transected with excellent hemostasis.  The bladder was then gently dissected   off the lower uterine segment and the cervix.  Colpotomy was made in the   circumferential fashion around the V-Care ring using J-hook.   Uterus and cervix was removed vaginally. Vaginal cuff was repaired using Vicryl 0 with the vaginal angles incorporated to the utero-sacrals.  There was excellent hemostasis.     Indigo carmine was given IV by the anesthesiologist.     Attention was then turned to the cystoscopy part of the  procedure using a   70-degree cystoscope with normal saline as distending media.  The cystoscope   was then gently inserted with good visualization of the bladder with the   findings noted above.  There was no foreign objects, no injury, no   sutures in the bladder as well as up the urethra. Bilateral ureteral jets were seen . Cystoscopy was then terminated.       At this same time, Dr Mcnulty resumed the laparoscopic part, abdominal-pelvic   cavity was insufflated with CO2, pelvic cavity and pedicles were re-inspected.   Copious irrigation was done. There was excellent hemostasis.   Abdomen was de-sufflated. All ports and instruments were removed under  direct visualization with excellent hemostasis.     The patient tolerated the procedure well, no intraoperative complications   noted.  She was sent to the recovery room in stable condition.    ARTURO MORRIS MD

## 2020-02-25 NOTE — OR NURSING
1610 Report from JEFFREY Vasquez. Pt moved to [facility] PACU 8 with  and all belongings. Encouraging deep breathing, weaned to RA.     1750 Attempted to take pt to bathroom via gurney, upon sitting at EOB pt became lightheaded/pale. Moved back to [facility], BP stable on reassessment, placed pt on bedpan.     1800 Instructions reviewed in [facility] with , Raffi. Pt to bathroom to attempt to void again.      1845 Pt up to bathroom again, no success with void. Bladder scan with minimal urine, provided just, continuing to monitor.     1930 Provided tea to help pt ability to void.    2000 Pt states she will attempt to void soon.    2020 Pt ambulated to bathroom with  and daughter. Able to void w/o difficulty, moderate amount of light yellow urine noted. Dressed with family's assistance.    2040 Discharge orders received. Meets discharge criteria at this time. Tolerable pain. No nausea. Tolerating PO. On RA. All lines and monitors discontinued. Reviewed discharge paperwork with pt and family previously with . Discussed diet, activity, medications, follow up care and worsening symptoms. No questions at this time. Pt to be discharged to home via private vehicle. Pt escorted out of department in wheelchair with RN, family, and all belongings.

## 2021-06-03 ENCOUNTER — OFFICE VISIT (OUTPATIENT)
Dept: URGENT CARE | Facility: PHYSICIAN GROUP | Age: 41
End: 2021-06-03
Payer: COMMERCIAL

## 2021-06-03 VITALS
RESPIRATION RATE: 18 BRPM | DIASTOLIC BLOOD PRESSURE: 60 MMHG | HEART RATE: 60 BPM | SYSTOLIC BLOOD PRESSURE: 100 MMHG | TEMPERATURE: 98 F | OXYGEN SATURATION: 99 % | HEIGHT: 62 IN | WEIGHT: 160 LBS | BODY MASS INDEX: 29.44 KG/M2

## 2021-06-03 DIAGNOSIS — S16.1XXA STRAIN OF NECK MUSCLE, INITIAL ENCOUNTER: Primary | ICD-10-CM
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
## 2021-06-03 PROCEDURE — 99214 OFFICE O/P EST MOD 30 MIN: CPT | Performed by: PHYSICIAN ASSISTANT

## 2021-06-03 RX ORDER — CYCLOBENZAPRINE HCL 10 MG
10 TABLET ORAL
Qty: 7 TABLET | Refills: 0 | Status: SHIPPED | OUTPATIENT
Start: 2021-06-03 | End: 2021-06-10

## 2021-06-03 ASSESSMENT — ENCOUNTER SYMPTOMS: NECK PAIN: 1

## 2021-06-04 NOTE — PATIENT INSTRUCTIONS
Ejercicios para el damien  Neck Exercises  Pregunte al médico qué ejercicios son seguros para usted. Sade los ejercicios exactamente daniel se lo haya indicado el médico y gradúelos daniel se lo hayan indicado. Es normal sentir un estiramiento leve, tironeo, opresión o malestar al hacer estos ejercicios. Deténgase de inmediato si siente un dolor repentino o el dolor empeora. No comience a hacer estos ejercicios hasta que se lo indique el médico.  Los ejercicios para el damien pueden ser importantes por muchos motivos. Pueden mejorar la fuerza y mantener la flexibilidad del damien, lo que será útil para la parte superior de la espalda y para prevenir el dolor de damien.  Ejercicios de estiramiento  Estiramiento del damien con rotación    1. Siéntese en shameka silla o párese.  2. Apoye los pies en el piso, con shameka separación del ancho de los hombros.  3. Gire lentamente la ramirez (rótela) hacia la derecha hasta sentir un ligero estiramiento. Gírela completamente hacia la derecha de modo que pueda bernice por encima de pathak hombro derecho. No incline ni ladee la ramirez.  4. Mantenga esta posición ck 10 a 30 segundos.  5. Gire lentamente la ramirez (rótela) hacia la izquierda hasta sentir un ligero estiramiento. Gírela completamente hacia la izquierda de modo que pueda bernice por encima de pathak hombro vishal. No incline ni ladee la ramirez.  6. Mantenga esta posición ck 10 a 30 segundos.  Repita __________ veces. Realice scar ejercicio __________ veces al día.  Retracción del damien  1. Siéntese en shameka silla resistente o párese.  2. Cris hacia adelante. No doble el damien.  3. Use los dedos para empujar la barbilla hacia atrás (retracción). No doble el damien al realizar scar movimiento. Siga mirando hacia adelante. Si hace el ejercicio correctamente, tendrá shameka leve sensación en la garganta y sentirá que la nuca se estira.  4. Mantenga la elongación ck 1 o 2 segundos.  Repita __________ veces. Realice scar ejercicio  __________ veces al día.  Ejercicios de fortalecimiento  Presión con el damien  1. Recuéstese sobre pathak espalda en shameka cama firme o en el suelo, y coloque shameka almohada debajo de la ramirez.  2. Use los músculos del damien para presionar la ramirez contra la almohada y enderezar la columna vertebral.  3. Mantenga la posición lo mejor que pueda. Mantenga la ramirez hacia arriba (en posición neutral) y el mentón hacia abajo.  4. Cuente lentamente hasta 5 mientras mantiene esta posición.  Repita __________ veces. Realice scar ejercicio __________ veces al día.  Isometría  Estos son ejercicios en los que se fortalecen los músculos del damien mientras se mantiene el damien quieto (isometría).  1. Siéntese en shameka silla con buen apoyo y coloque shameka mano sobre la frente.  2. Mantenga la ramirez y la adin mirando hacia adelante. No flexione o extienda el damien mientras hace ejercicios isométricos.  3. Empuje hacia adelante con la ramirez y el damien mientras que con la mano ejerce cierta presión hacia el lado contrario. Mantenga esta posición ck 10 segundos.  4. Vuelva a realizar la secuencia, esta vez poniendo la mano contra la nuca. Use la ramirez y el damien para empujar en la dirección contraria a la presión que ejerce con la mano.  5. Para terminar, trinity el mismo ejercicio en cada lado de la ramirez, empujando hacia los lados en la dirección contraria a la presión de la mano.  Repita __________ veces. Realice scra ejercicio __________ veces al día.  Levantamientos de la ramirez desde la posición decúbito prono  1. Acuéstese boca abajo (posición prona) y apoye los codos de modo que el pecho y la parte superior de la espalda se eleven.  2. Comience con la ramirez mirando hacia abajo, cerca del pecho. Coloque el mentón cerca del pecho o sobre scar.  3. Eleve lentamente la ramirez. Hágalo hasta quedar mirando hacia adelante. Luego continúe elevando y estirando la ramirez hacia atrás lo más que pueda con comodidad.  4. Mantenga la  ramirez elevada ck 5 segundos. A continuación, bájela lentamente hasta la posición inicial.  Repita __________ veces. Realice scar ejercicio __________ veces al día.  Levantamientos de la ramirez desde la posición decúbito supino  1. Acuéstese sobre la espalda (posición decúbito supino), doble las rodillas apuntando hacia el techo y mantenga los pies apoyados en el piso.  2. Levante lentamente la ramirez del suelo, y eleve el mentón hacia el pecho.  3. Mantenga esta posición ck 5 segundos.  Repita __________ veces. Realice scar ejercicio __________ veces al día.  Retracción escapular  1. Párese con los brazos a los costados. Cris hacia adelante.  2. Lentamente, lleve los hombros (escápulas) hacia atrás y hacia abajo (retracción) hasta sentir que la gallo de los omóplatos, en la parte mane de la espalda, se estira.  3. Mantenga esta posición ck 10 a 30 segundos.  4. Relájese y luego repita el ejercicio.  Repita __________ veces. Realice scar ejercicio __________ veces al día.  Comuníquese con un médico si:  · El dolor o las molestias en el damien se vuelven mucho más intensos cuando hace un ejercicio.  · El dolor o las molestias en el damien no mejoran en el término de las 2 horas posteriores a hacer los ejercicios.  Si tiene alguno de estos problemas, deje de hacer los ejercicios de inmediato. No vuelva a hacer los ejercicios a menos que el médico lo autorice.  Solicite ayuda inmediatamente si:  · Siente un dolor súbito e intenso en el damien.  Si esto ocurre, deje de hacer los ejercicios de inmediato. No vuelva a hacer los ejercicios a menos que el médico lo autorice.  Esta información no tiene daniel fin reemplazar el consejo del médico. Asegúrese de hacerle al médico cualquier pregunta que tenga.  Document Released: 01/13/2017 Document Revised: 11/19/2019 Document Reviewed: 11/19/2019  Elsevier Patient Education © 2020 [employer]
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
## 2021-06-09 ENCOUNTER — TELEPHONE (OUTPATIENT)
Dept: SCHEDULING | Facility: IMAGING CENTER | Age: 41
End: 2021-06-09

## 2021-10-14 PROBLEM — M25.531 RIGHT WRIST PAIN: Status: ACTIVE | Noted: 2021-10-14

## 2023-01-12 PROBLEM — M67.431 GANGLION CYST OF DORSUM OF RIGHT WRIST: Status: ACTIVE | Noted: 2023-01-12

## 2023-11-09 ENCOUNTER — APPOINTMENT (OUTPATIENT)
Dept: URGENT CARE | Facility: PHYSICIAN GROUP | Age: 43
End: 2023-11-09

## 2023-11-09 ENCOUNTER — OFFICE VISIT (OUTPATIENT)
Dept: URGENT CARE | Facility: PHYSICIAN GROUP | Age: 43
End: 2023-11-09
Payer: COMMERCIAL

## 2023-11-09 VITALS
SYSTOLIC BLOOD PRESSURE: 124 MMHG | BODY MASS INDEX: 32.39 KG/M2 | RESPIRATION RATE: 20 BRPM | OXYGEN SATURATION: 100 % | WEIGHT: 176 LBS | HEIGHT: 62 IN | TEMPERATURE: 96 F | DIASTOLIC BLOOD PRESSURE: 78 MMHG | HEART RATE: 70 BPM

## 2023-11-09 DIAGNOSIS — M79.10 MYALGIA: ICD-10-CM

## 2023-11-09 DIAGNOSIS — J02.9 SORE THROAT: ICD-10-CM

## 2023-11-09 DIAGNOSIS — J02.0 STREP THROAT: Primary | ICD-10-CM

## 2023-11-09 DIAGNOSIS — H92.03 OTALGIA OF BOTH EARS: ICD-10-CM

## 2023-11-09 LAB
FLUAV RNA SPEC QL NAA+PROBE: NEGATIVE
FLUBV RNA SPEC QL NAA+PROBE: NEGATIVE
RSV RNA SPEC QL NAA+PROBE: NEGATIVE
S PYO DNA SPEC NAA+PROBE: DETECTED
SARS-COV-2 RNA RESP QL NAA+PROBE: NEGATIVE

## 2023-11-09 PROCEDURE — 87651 STREP A DNA AMP PROBE: CPT | Performed by: NURSE PRACTITIONER

## 2023-11-09 PROCEDURE — 99214 OFFICE O/P EST MOD 30 MIN: CPT | Performed by: NURSE PRACTITIONER

## 2023-11-09 PROCEDURE — 0241U POCT CEPHEID COV-2, FLU A/B, RSV - PCR: CPT | Performed by: NURSE PRACTITIONER

## 2023-11-09 PROCEDURE — 3074F SYST BP LT 130 MM HG: CPT | Performed by: NURSE PRACTITIONER

## 2023-11-09 PROCEDURE — 3078F DIAST BP <80 MM HG: CPT | Performed by: NURSE PRACTITIONER

## 2023-11-09 RX ORDER — AMOXICILLIN 500 MG/1
500 CAPSULE ORAL 2 TIMES DAILY
Qty: 20 CAPSULE | Refills: 0 | Status: SHIPPED | OUTPATIENT
Start: 2023-11-09 | End: 2023-11-19

## 2023-11-09 NOTE — PROGRESS NOTES
"Presley Mccallum is a 43 y.o. female who presents for Pharyngitis (X1 day both Ears plugged and painful, body aches, )      HPI  This is a new problem. Presley Mccallum is a 43 y.o. patient who presents to urgent care with c/o: Sore throat for 1 day with body aches and chills.  Her ears are painful.  It hurts when she swallows.  She has a mild headache.  Treatments tried none.  She is concerned about having a throat infection.  No other aggravating or alleviating factors. Hx of previous strep throat infections. This feels similar.     ROS See HPI    Allergies:       Allergies   Allergen Reactions    Shellfish Allergy      Rash        PMSFS Hx:  Past Medical History:   Diagnosis Date    Anesthesia     \"I felt like I couldn't breath after anesthesia\"     Bradycardia 02/24/2020    HR 46-50 today    Hypotension     \"I tend to run low\"      Past Surgical History:   Procedure Laterality Date    PB REVISE ULNAR NERVE AT WRIST Right 2/10/2023    Procedure: RIGHT WRIST DORSAL GANGLIONECTOMY;  Surgeon: Tin Herzog M.D.;  Location: [facility];  Service: Orthopedics    HYSTERECTOMY LAPAROSCOPY N/A 2/24/2020    Procedure: HYSTERECTOMY, LAPAROSCOPIC;  Surgeon: Flower Rocha M.D.;  Location: SURGERY SAME DAY [facility];  Service: Labor and Delivery    SALPINGECTOMY Bilateral 2/24/2020    Procedure: SALPINGECTOMY;  Surgeon: Flower Rocha M.D.;  Location: SURGERY SAME DAY [facility];  Service: Labor and Delivery    CYSTOSCOPY N/A 2/24/2020    Procedure: CYSTOSCOPY;  Surgeon: Flower Rocha M.D.;  Location: SURGERY SAME DAY [facility];  Service: Labor and Delivery    TUBAL LIGATION  2011     History reviewed. No pertinent family history.  Social History     Tobacco Use    Smoking status: Never    Smokeless tobacco: Never   Substance Use Topics    Alcohol use: No       Problems:   Patient Active Problem List   Diagnosis    " "Obesity    Pelvic pain in female    Abnormal uterine bleeding    Right wrist pain    Ganglion cyst of dorsum of right wrist       Medications:   No current outpatient medications on file prior to visit.     No current facility-administered medications on file prior to visit.        Objective:     /78 (BP Location: Left arm, Patient Position: Sitting, BP Cuff Size: Adult)   Pulse 70   Temp (!) 35.6 °C (96 °F) (Temporal)   Resp 20   Ht 1.575 m (5' 2\")   Wt 79.8 kg (176 lb)   SpO2 100%   BMI 32.19 kg/m²     Physical Exam  Vitals and nursing note reviewed.   Constitutional:       General: She is not in acute distress.     Appearance: Normal appearance. She is well-developed. She is not ill-appearing or toxic-appearing.   HENT:      Head: Normocephalic and atraumatic.      Right Ear: Hearing, ear canal and external ear normal. Tympanic membrane is injected.      Left Ear: Hearing, tympanic membrane, ear canal and external ear normal.      Nose: Mucosal edema present.      Right Sinus: No maxillary sinus tenderness or frontal sinus tenderness.      Left Sinus: No maxillary sinus tenderness or frontal sinus tenderness.      Mouth/Throat:      Mouth: Mucous membranes are moist.      Pharynx: Uvula midline. Posterior oropharyngeal erythema present. No oropharyngeal exudate.   Eyes:      General: Lids are normal.      Conjunctiva/sclera: Conjunctivae normal.      Pupils: Pupils are equal, round, and reactive to light.   Neck:      Trachea: Trachea and phonation normal.   Cardiovascular:      Rate and Rhythm: Normal rate and regular rhythm.      Pulses: Normal pulses.      Heart sounds: Normal heart sounds.   Pulmonary:      Effort: Pulmonary effort is normal. No tachypnea.      Breath sounds: Normal breath sounds. No decreased breath sounds.   Musculoskeletal:         General: Normal range of motion.      Cervical back: Full passive range of motion without pain, normal range of motion and neck supple. "   Lymphadenopathy:      Head:      Right side of head: No submandibular or tonsillar adenopathy.      Left side of head: No submandibular or tonsillar adenopathy.      Cervical: No cervical adenopathy.      Upper Body:      Right upper body: No supraclavicular adenopathy.      Left upper body: No supraclavicular adenopathy.   Skin:     General: Skin is warm and dry.      Findings: No rash.   Neurological:      Mental Status: She is alert and oriented to person, place, and time.      Gait: Gait normal.   Psychiatric:         Speech: Speech normal.         Behavior: Behavior normal.         Thought Content: Thought content normal.         Judgment: Judgment normal.         Results for orders placed or performed in visit on 11/09/23   POCT CEPHEID GROUP A STREP - PCR   Result Value Ref Range    POC Group A Strep, PCR Detected (A) Not Detected, Invalid   POCT CEPHEID COV-2, FLU A/B, RSV - PCR   Result Value Ref Range    SARS-CoV-2 by PCR Negative Negative, Invalid    Influenza virus A RNA Negative Negative, Invalid    Influenza virus B, PCR Negative Negative, Invalid    RSV, PCR Negative Negative, Invalid         Assessment /Associated Orders:      1. Strep throat  amoxicillin (AMOXIL) 500 MG Cap      2. Sore throat  POCT CEPHEID GROUP A STREP - PCR    POCT CEPHEID COV-2, FLU A/B, RSV - PCR      3. Myalgia  POCT CEPHEID GROUP A STREP - PCR    POCT CEPHEID COV-2, FLU A/B, RSV - PCR      4. Otalgia of both ears              Medical Decision Making:    Ma is a very pleasant 43 y.o. female who is clinically stable at today's acute urgent care visit.  No acute distress noted.  VSS. Appropriate for outpatient care at this time.   Acute problem today with uncertain prognosis. + GAS, Neg covid, influenza and RSV.   Educated in proper administration of  prescription medication(s) ordered today including safety, possible SE, risks, benefits, rationale and alternatives to therapy.   Educated in infection control practices.   Salt  water gargles BID and prn. Suggested 1/4 to 1/2 teaspoon (1.5 to 3.0 g) of salt per one cup (8 ounces or 250 mL) of warm water.   OTC throat analgesic spray or lozenge of choice prn throat pain. Dosage and directions per   OTC  analgesic of choice (acetaminophen or NSAID) prn pain. Follow manufactures dosing and safety precautions.   Stay well hydrated.   Educated in infection control practices.       Discussed Dx, management options (risks,benefits, and alternatives to planned treatment), natural progression and supportive care.  Expressed understanding and the treatment plan was agreed upon.   Questions were encouraged and answered   Return to urgent care prn if new or worsening sx or if there is no improvement in condition prn.          Time I spent evaluating Presley Mccallum in urgent care today was 31 minutes. This time includes preparing for visit, reviewing any pertinent notes or test results, counseling/education, exam, obtaining HPI, interpretation of lab tests, medication management and documentation as indicated above.Time does not include separately billable procedures noted .       Please note that this dictation was created using voice recognition software. I have worked with consultants from the vendor as well as technical experts from [facility] to optimize the interface. I have made every reasonable attempt to correct obvious errors, but I expect that there are errors of grammar and possibly content that I did not discover before finalizing the note.  This note was electronically signed by provider

## 2024-11-20 ENCOUNTER — OFFICE VISIT (OUTPATIENT)
Dept: URGENT CARE | Facility: PHYSICIAN GROUP | Age: 44
End: 2024-11-20
Payer: COMMERCIAL

## 2024-11-20 VITALS
SYSTOLIC BLOOD PRESSURE: 100 MMHG | TEMPERATURE: 97.7 F | BODY MASS INDEX: 33.84 KG/M2 | DIASTOLIC BLOOD PRESSURE: 58 MMHG | HEIGHT: 62 IN | HEART RATE: 65 BPM | OXYGEN SATURATION: 98 % | WEIGHT: 183.9 LBS | RESPIRATION RATE: 24 BRPM

## 2024-11-20 DIAGNOSIS — M25.50 PAIN IN JOINT INVOLVING MULTIPLE SITES: ICD-10-CM

## 2024-11-20 PROCEDURE — 3074F SYST BP LT 130 MM HG: CPT | Performed by: FAMILY MEDICINE

## 2024-11-20 PROCEDURE — 99214 OFFICE O/P EST MOD 30 MIN: CPT | Performed by: FAMILY MEDICINE

## 2024-11-20 PROCEDURE — 3078F DIAST BP <80 MM HG: CPT | Performed by: FAMILY MEDICINE

## 2024-11-20 RX ORDER — IBUPROFEN 200 MG
200 TABLET ORAL EVERY 6 HOURS PRN
COMMUNITY

## 2024-11-20 RX ORDER — DEXAMETHASONE 6 MG/1
6 TABLET ORAL DAILY
Qty: 4 TABLET | Refills: 0 | Status: SHIPPED | OUTPATIENT
Start: 2024-11-21 | End: 2024-11-25

## 2024-11-20 RX ORDER — DEXAMETHASONE SODIUM PHOSPHATE 4 MG/ML
8 INJECTION, SOLUTION INTRA-ARTICULAR; INTRALESIONAL; INTRAMUSCULAR; INTRAVENOUS; SOFT TISSUE ONCE
Status: COMPLETED | OUTPATIENT
Start: 2024-11-20 | End: 2024-11-20

## 2024-11-20 RX ADMIN — DEXAMETHASONE SODIUM PHOSPHATE 8 MG: 4 INJECTION, SOLUTION INTRA-ARTICULAR; INTRALESIONAL; INTRAMUSCULAR; INTRAVENOUS; SOFT TISSUE at 17:09

## 2024-11-20 ASSESSMENT — ENCOUNTER SYMPTOMS: MYALGIAS: 1

## 2024-11-20 NOTE — LETTER
November 20, 2024         Patient: Presley Mccallum   YOB: 1980   Date of Visit: 11/20/2024           To Whom it May Concern:    Presley Mccallum was seen in my clinic on 11/20/2024. She may return to work in 1-2 days.    If you have any questions or concerns, please don't hesitate to call.        Sincerely,           Johnny Toledo M.D.  Electronically Signed

## 2024-11-21 ENCOUNTER — HOSPITAL ENCOUNTER (OUTPATIENT)
Dept: LAB | Facility: MEDICAL CENTER | Age: 44
End: 2024-11-21
Attending: FAMILY MEDICINE
Payer: COMMERCIAL

## 2024-11-21 DIAGNOSIS — M25.50 PAIN IN JOINT INVOLVING MULTIPLE SITES: ICD-10-CM

## 2024-11-21 LAB
ALBUMIN SERPL BCP-MCNC: 4.3 G/DL (ref 3.2–4.9)
ALBUMIN/GLOB SERPL: 1.3 G/DL
ALP SERPL-CCNC: 100 U/L (ref 30–99)
ALT SERPL-CCNC: 121 U/L (ref 2–50)
ANION GAP SERPL CALC-SCNC: 10 MMOL/L (ref 7–16)
ANISOCYTOSIS BLD QL SMEAR: ABNORMAL
AST SERPL-CCNC: 37 U/L (ref 12–45)
BASOPHILS # BLD AUTO: 0 % (ref 0–1.8)
BASOPHILS # BLD: 0 K/UL (ref 0–0.12)
BILIRUB SERPL-MCNC: 0.3 MG/DL (ref 0.1–1.5)
BUN SERPL-MCNC: 13 MG/DL (ref 8–22)
CALCIUM ALBUM COR SERPL-MCNC: 9.5 MG/DL (ref 8.5–10.5)
CALCIUM SERPL-MCNC: 9.7 MG/DL (ref 8.5–10.5)
CHLORIDE SERPL-SCNC: 109 MMOL/L (ref 96–112)
CO2 SERPL-SCNC: 19 MMOL/L (ref 20–33)
CREAT SERPL-MCNC: 0.83 MG/DL (ref 0.5–1.4)
CRP SERPL HS-MCNC: 0.89 MG/DL (ref 0–0.75)
EOSINOPHIL # BLD AUTO: 0 K/UL (ref 0–0.51)
EOSINOPHIL NFR BLD: 0 % (ref 0–6.9)
ERYTHROCYTE [DISTWIDTH] IN BLOOD BY AUTOMATED COUNT: 38 FL (ref 35.9–50)
ERYTHROCYTE [SEDIMENTATION RATE] IN BLOOD BY WESTERGREN METHOD: 18 MM/HOUR (ref 0–25)
GFR SERPLBLD CREATININE-BSD FMLA CKD-EPI: 89 ML/MIN/1.73 M 2
GLOBULIN SER CALC-MCNC: 3.2 G/DL (ref 1.9–3.5)
GLUCOSE SERPL-MCNC: 122 MG/DL (ref 65–99)
HBV CORE AB SERPL QL IA: NONREACTIVE
HBV SURFACE AB SERPL IA-ACNC: <3.5 MIU/ML (ref 0–10)
HBV SURFACE AG SER QL: NONREACTIVE
HCT VFR BLD AUTO: 40.6 % (ref 37–47)
HCV AB SER QL: NONREACTIVE
HGB BLD-MCNC: 13.9 G/DL (ref 12–16)
LYMPHOCYTES # BLD AUTO: 1.16 K/UL (ref 1–4.8)
LYMPHOCYTES NFR BLD: 8.6 % (ref 22–41)
MANUAL DIFF BLD: NORMAL
MCH RBC QN AUTO: 30.5 PG (ref 27–33)
MCHC RBC AUTO-ENTMCNC: 34.2 G/DL (ref 32.2–35.5)
MCV RBC AUTO: 89.2 FL (ref 81.4–97.8)
MICROCYTES BLD QL SMEAR: ABNORMAL
MONOCYTES # BLD AUTO: 0.58 K/UL (ref 0–0.85)
MONOCYTES NFR BLD AUTO: 4.3 % (ref 0–13.4)
MORPHOLOGY BLD-IMP: NORMAL
NEUTROPHILS # BLD AUTO: 11.76 K/UL (ref 1.82–7.42)
NEUTROPHILS NFR BLD: 87.1 % (ref 44–72)
NRBC # BLD AUTO: 0 K/UL
NRBC BLD-RTO: 0 /100 WBC (ref 0–0.2)
PLATELET # BLD AUTO: 310 K/UL (ref 164–446)
PLATELET BLD QL SMEAR: NORMAL
PMV BLD AUTO: 10.4 FL (ref 9–12.9)
POTASSIUM SERPL-SCNC: 4.5 MMOL/L (ref 3.6–5.5)
PROT SERPL-MCNC: 7.5 G/DL (ref 6–8.2)
RBC # BLD AUTO: 4.55 M/UL (ref 4.2–5.4)
RBC BLD AUTO: PRESENT
RHEUMATOID FACT SER IA-ACNC: <10 IU/ML (ref 0–14)
SODIUM SERPL-SCNC: 138 MMOL/L (ref 135–145)
TSH SERPL DL<=0.005 MIU/L-ACNC: 0.76 UIU/ML (ref 0.38–5.33)
WBC # BLD AUTO: 13.5 K/UL (ref 4.8–10.8)

## 2024-11-21 PROCEDURE — 86038 ANTINUCLEAR ANTIBODIES: CPT

## 2024-11-21 PROCEDURE — 86140 C-REACTIVE PROTEIN: CPT

## 2024-11-21 PROCEDURE — 84443 ASSAY THYROID STIM HORMONE: CPT

## 2024-11-21 PROCEDURE — 80053 COMPREHEN METABOLIC PANEL: CPT

## 2024-11-21 PROCEDURE — 86704 HEP B CORE ANTIBODY TOTAL: CPT

## 2024-11-21 PROCEDURE — 87340 HEPATITIS B SURFACE AG IA: CPT

## 2024-11-21 PROCEDURE — 36415 COLL VENOUS BLD VENIPUNCTURE: CPT

## 2024-11-21 PROCEDURE — 86200 CCP ANTIBODY: CPT

## 2024-11-21 PROCEDURE — 86039 ANTINUCLEAR ANTIBODIES (ANA): CPT

## 2024-11-21 PROCEDURE — 85007 BL SMEAR W/DIFF WBC COUNT: CPT

## 2024-11-21 PROCEDURE — 86706 HEP B SURFACE ANTIBODY: CPT

## 2024-11-21 PROCEDURE — 86747 PARVOVIRUS ANTIBODY: CPT | Mod: 91

## 2024-11-21 PROCEDURE — 86235 NUCLEAR ANTIGEN ANTIBODY: CPT | Mod: 91

## 2024-11-21 PROCEDURE — 85027 COMPLETE CBC AUTOMATED: CPT

## 2024-11-21 PROCEDURE — 86431 RHEUMATOID FACTOR QUANT: CPT

## 2024-11-21 PROCEDURE — 85652 RBC SED RATE AUTOMATED: CPT

## 2024-11-21 PROCEDURE — 86803 HEPATITIS C AB TEST: CPT

## 2024-11-21 PROCEDURE — 86225 DNA ANTIBODY NATIVE: CPT

## 2024-11-21 NOTE — PROGRESS NOTES
"Subjective     Presley Mccallum is a 44 y.o. female who presents with Body Aches (X2 weeks ago, body ache and bones hurt, hurts most in ankles and hands, fingers feel swollen)    This is a  new problem with uncertain prognosis:   This is a very pleasant 44 y.o. who has come to the walk-in clinic today for 2 or 3 weeks with feeling pain in the joints swelling and stiffness.  Says it feels it when she wakes up in the morning and last all day and night.  Feels that they look swollen and sometimes look red.  Nothing new can think of to have caused it no fevers no sore throat no respiratory symptoms no urinary symptoms does feel has a little bit of an itchy skin or rash on her back and arms.  This started about 6 days ago.  No new medications except over-the-counter Motrin which helps only a little bit for the joint pains.  Says feels a pain in her shoulders elbows wrists and knuckles of the fingers and her hips and knees and ankle mainly.    [employer] I had  used for translation services today      ALLERGIES:  Shellfish allergy     PMH:  Past Medical History:   Diagnosis Date    Anesthesia     \"I felt like I couldn't breath after anesthesia\"     Bradycardia 02/24/2020    HR 46-50 today    Hypotension     \"I tend to run low\"         PSH:  Past Surgical History:   Procedure Laterality Date    PB REVISE ULNAR NERVE AT WRIST Right 2/10/2023    Procedure: RIGHT WRIST DORSAL GANGLIONECTOMY;  Surgeon: Tin Herzog M.D.;  Location: [facility];  Service: Orthopedics    HYSTERECTOMY LAPAROSCOPY N/A 2/24/2020    Procedure: HYSTERECTOMY, LAPAROSCOPIC;  Surgeon: Flower Rocha M.D.;  Location: SURGERY SAME DAY [facility];  Service: Labor and Delivery    SALPINGECTOMY Bilateral 2/24/2020    Procedure: SALPINGECTOMY;  Surgeon: Flower Rocha M.D.;  Location: SURGERY SAME DAY [facility];  Service: Labor and Delivery    CYSTOSCOPY N/A 2/24/2020    " "Procedure: CYSTOSCOPY;  Surgeon: Flower Rocha M.D.;  Location: SURGERY SAME DAY [facility];  Service: Labor and Delivery    TUBAL LIGATION  2011       MEDS:    Current Outpatient Medications:     ibuprofen (MOTRIN) 200 MG Tab, Take 200 mg by mouth every 6 hours as needed., Disp: , Rfl:     [START ON 11/21/2024] dexamethasone (DECADRON) 6 MG Tab, Take 1 Tablet by mouth every day for 4 days., Disp: 4 Tablet, Rfl: 0    ** I have documented what I find to be significant in regards to past medical, social, family and surgical history  in my HPI or under PMH/PSH/FH review section, otherwise it is noncontributory **           HPI    Review of Systems   Musculoskeletal:  Positive for joint pain and myalgias.   All other systems reviewed and are negative.             Objective     /58 (BP Location: Right arm, Patient Position: Sitting, BP Cuff Size: Adult)   Pulse 65   Temp 36.5 °C (97.7 °F) (Temporal)   Resp (!) 24   Ht 1.58 m (5' 2.21\")   Wt 83.4 kg (183 lb 14.4 oz)   SpO2 98%   BMI 33.41 kg/m²      Physical Exam  Vitals and nursing note reviewed.   Constitutional:       General: She is not in acute distress.     Appearance: Normal appearance. She is well-developed. She is not ill-appearing, toxic-appearing or diaphoretic.   HENT:      Head: Normocephalic.      Mouth/Throat:      Mouth: Mucous membranes are moist.      Pharynx: No oropharyngeal exudate.   Cardiovascular:      Rate and Rhythm: Normal rate and regular rhythm.   Pulmonary:      Effort: Pulmonary effort is normal. No respiratory distress.      Breath sounds: Normal breath sounds. No wheezing, rhonchi or rales.   Musculoskeletal:      Comments: Reports pain and stiffness when trying to raise her arms above her head and get up and down from a chair and her hips and knees and ankle.  No obvious edema or erythema over the elbow wrist finger joints ankle or feet.  Does report some mild tenderness when I press on them   Skin:     " Findings: Rash present.      Comments: Faint erythema noticed on the right forearm.     Neurological:      Mental Status: She is alert.      Motor: No abnormal muscle tone.   Psychiatric:         Mood and Affect: Mood normal.         Behavior: Behavior normal.                             Assessment & Plan     1. Pain in joint involving multiple sites  CBC WITH DIFFERENTIAL    Comp Metabolic Panel    TSH WITH REFLEX TO FT4    Sed Rate    CRP QUANTITIVE (NON-CARDIAC)    PARVOVIRUS B19 HUMAN IGG/IGM SERUM    dexamethasone (DECADRON) 6 MG Tab    dexamethasone (Decadron) injection 8 mg    Referral to establish with PCP          Pleasant well-appearing 44-year-old female with nonspecific polyarthralgia.  Vital signs and exam otherwise reassuring.  Will check some basic lab work and some inflammatory markers and strongly encouraged her to follow-up with PCP.  Will do short course of steroids in the meantime to see if this provides any relief.      - Dx, plan & d/c instructions discussed   - Rest, stay hydrated, OTC Motrin and/or Tylenol as needed      Follow up with your regular primary care providers office within a week to keep them updated and informed of this visit and for regular routine health maintenance check-ups. ER if not improving in 2-3 days or if feeling/getting worse. (If you do not have a primary care provider and need to schedule one you may call [facility] at [phone] to do this).    Patient left in stable condition     Discussed if any testing, labs or imaging studies are obtained outside of the [facility] facility, it is their responsibility to contact the Urgent Care and let us know that it was done and get us the results so adequate follow up can be initiated    Pertinent prior lab work and/or imaging studies in Epic have been reviewed by me today on day of this visit and taken into account for my treatment and plan today    Pertinent PMH/PSH and/or chronic conditions and medications if any were reviewed  today and taken into account for my treatment and plan today    Pertinent prior office visit notes in Epic have been reviewed by me today on day of this visit.    Please note that this dictation may have been created using voice recognition software, if so I have made every reasonable attempt to correct obvious errors, but I expect that there are errors of grammar and possibly content that I did not discover before finalizing the note.

## 2024-11-22 LAB — NUCLEAR IGG SER QL IA: DETECTED

## 2024-11-23 LAB
ANA PAT SER IF-IMP: ABNORMAL
ANA PAT SER IF-IMP: ABNORMAL
CCP IGA+IGG SERPL IA-ACNC: 6 UNITS (ref 0–19)
NUCLEAR IGG SER QL IF: DETECTED
NUCLEAR IGG TITR SER IF: ABNORMAL {TITER}

## 2024-11-24 LAB
B19V IGG SER IA-ACNC: 6.12 IV
B19V IGM SER IA-ACNC: 18.66 IV
DSDNA AB TITR SER CLIF: NORMAL {TITER}
U1 SNRNP IGG SER QL: 4 UNITS (ref 0–19)

## 2024-11-25 LAB
ENA JO1 AB TITR SER: 4 AU/ML (ref 0–40)
ENA SCL70 IGG SER QL: 18 AU/ML (ref 0–40)
ENA SM IGG SER-ACNC: 2 AU/ML (ref 0–40)
ENA SS-A 60KD AB SER-ACNC: 2 AU/ML (ref 0–40)
ENA SS-A IGG SER QL: 5 AU/ML (ref 0–40)
ENA SS-B IGG SER IA-ACNC: 1 AU/ML (ref 0–40)

## 2025-01-02 ENCOUNTER — APPOINTMENT (OUTPATIENT)
Dept: RADIOLOGY | Facility: MEDICAL CENTER | Age: 45
End: 2025-01-02
Attending: OBSTETRICS & GYNECOLOGY
Payer: COMMERCIAL

## 2025-01-03 ENCOUNTER — HOSPITAL ENCOUNTER (OUTPATIENT)
Dept: RADIOLOGY | Facility: MEDICAL CENTER | Age: 45
End: 2025-01-03
Attending: OBSTETRICS & GYNECOLOGY
Payer: COMMERCIAL

## 2025-01-03 DIAGNOSIS — N64.4 MASTODYNIA: ICD-10-CM

## 2025-01-03 DIAGNOSIS — Z12.31 ENCOUNTER FOR SCREENING MAMMOGRAM FOR MALIGNANT NEOPLASM OF BREAST: ICD-10-CM

## 2025-01-03 PROCEDURE — 77067 SCR MAMMO BI INCL CAD: CPT

## 2025-01-06 ENCOUNTER — HOSPITAL ENCOUNTER (OUTPATIENT)
Dept: RADIOLOGY | Facility: MEDICAL CENTER | Age: 45
End: 2025-01-06
Payer: COMMERCIAL

## 2025-02-04 DIAGNOSIS — M25.50 ARTHRALGIA, UNSPECIFIED JOINT: ICD-10-CM

## 2025-03-20 ENCOUNTER — TELEPHONE (OUTPATIENT)
Dept: HEALTH INFORMATION MANAGEMENT | Facility: OTHER | Age: 45
End: 2025-03-20
Payer: COMMERCIAL

## 2025-04-01 NOTE — Clinical Note
REFERRAL APPROVAL NOTICE         Sent on March 31, 2025                   Presley Mccallum  [address] [place] [address]                   Dear Ms. Garcia Mccallum,    After a careful review of the medical information and benefit coverage, [facility] has processed your referral. See below for additional details.    If applicable, you must be actively enrolled with your insurance for coverage of the authorized service. If you have any questions regarding your coverage, please contact your insurance directly.    REFERRAL INFORMATION   Referral #:  [id number]  Referred-To Provider    Referred-By Provider:  Rheumatology    Johnny Toledo M.D.   [facility]      [address]  [address]  [place] [address]  [phone] [address]  [facility] [address]  [phone]    Referral Start Date:  03/27/2025  Referral End Date:   03/27/2026             SCHEDULING  If you do not already have an appointment, please call [phone] to make an appointment.     MORE INFORMATION  If you do not already have a [employer] account, sign up at: [email or web address]  You can access your medical information, make appointments, see lab results, billing information, and more.  If you have questions regarding this referral, please contact  the [facility] Referrals department at:             [phone]. Monday - Friday 8:00AM - 5:00PM.     Sincerely,    [facility]

## (undated) DEVICE — CLOSURE SKIN STRIP 1/2 X 4 IN - (STERI STRIP) (50/BX 4BX/CA)

## (undated) DEVICE — SET IRRIGATION CYSTOSCOPY TUBE L80 IN (20EA/CA)

## (undated) DEVICE — TRAY SRGPRP PVP IOD WT PRP - (20/CA)

## (undated) DEVICE — SUTURE 4-0 VICRYL PLUS FS-2 - 27 INCH (36/BX)

## (undated) DEVICE — SUTURE 0 VICRYL PLUS CT-2 - 27 INCH (36/BX)

## (undated) DEVICE — SODIUM CHL IRRIGATION 0.9% 1000ML (12EA/CA)

## (undated) DEVICE — NEPTUNE 4 PORT MANIFOLD - (20/PK)

## (undated) DEVICE — SET SUCTION/IRRIGATION WITH DISPOSABLE TIP (6/CA )PART #0250-070-520 IS A SUB

## (undated) DEVICE — TROCAR 5X100 SEPARTATOR ADV - FIXATION (6/BX)

## (undated) DEVICE — LIGASURE LAPAROSCOPIC 5MM - (6EA/CA)

## (undated) DEVICE — HEAD HOLDER JUNIOR/ADULT

## (undated) DEVICE — PAD SANITARY 11IN MAXI IND WRAPPED  (12EA/PK 24PK/CA)

## (undated) DEVICE — UTERINE MANIP V-CARE LARGE - (DAVINCI)  8/CA

## (undated) DEVICE — SENSOR SPO2 NEO LNCS ADHESIVE (20/BX) SEE USER NOTES

## (undated) DEVICE — SET EXTENSION WITH 2 PORTS (48EA/CA) ***PART #2C8610 IS A SUBSTITUTE*****

## (undated) DEVICE — GLOVE BIOGEL PI INDICATOR SZ 7.0 SURGICAL PF LF - (50/BX 4BX/CA)

## (undated) DEVICE — MASK ANESTHESIA ADULT  - (100/CA)

## (undated) DEVICE — GLOVE SZ 7 BIOGEL PI MICRO - PF LF (50PR/BX 4BX/CA)

## (undated) DEVICE — TROCAR 5X100 NON BLADED Z-TH - READ KII (6/BX)

## (undated) DEVICE — TUBING CLEARLINK DUO-VENT - C-FLO (48EA/CA)

## (undated) DEVICE — GLOVE BIOGEL SZ 6 PF LATEX - (50EA/BX 4BX/CA)

## (undated) DEVICE — TROCAR Z THREAD11MM OPTICAL - NON BLADED(6/BX)

## (undated) DEVICE — SUTURE GENERAL

## (undated) DEVICE — GLOVE BIOGEL SZ 7 SURGICAL PF LTX - (50PR/BX 4BX/CA)

## (undated) DEVICE — CANISTER SUCTION RIGID RED 1500CC (40EA/CA)

## (undated) DEVICE — LACTATED RINGERS INJ 1000 ML - (14EA/CA 60CA/PF)

## (undated) DEVICE — ENDOSTITCH10MM SUTURING DEVIC - (3/CA)

## (undated) DEVICE — CATHETER IV 20 GA X 1-1/4 ---SURG.& SDS ONLY--- (50EA/BX)

## (undated) DEVICE — CANISTER SUCTION 3000ML MECHANICAL FILTER AUTO SHUTOFF MEDI-VAC NONSTERILE LF DISP  (40EA/CA)

## (undated) DEVICE — KIT ANESTHESIA W/CIRCUIT & 3/LT BAG W/FILTER (20EA/CA)

## (undated) DEVICE — SET LEADWIRE 5 LEAD BEDSIDE DISPOSABLE ECG (1SET OF 5/EA)

## (undated) DEVICE — DRAPESURG STERI-DRAPE LONG - (10/BX 4BX/CA)

## (undated) DEVICE — CHLORAPREP 26 ML APPLICATOR - ORANGE TINT(25/CA)

## (undated) DEVICE — KIT  I.V. START (100EA/CA)

## (undated) DEVICE — PROTECTOR ULNA NERVE - (36PR/CA)

## (undated) DEVICE — TUBE CONNECTING SUCTION - CLEAR PLASTIC STERILE 72 IN (50EA/CA)

## (undated) DEVICE — SUTURE 2-0 VICRYL PLUS CT-2 - 27 INCH (36/BX)

## (undated) DEVICE — SUCTION INSTRUMENT YANKAUER BULBOUS TIP W/O VENT (50EA/CA)

## (undated) DEVICE — HEMOSTAT ARISTA PWD 3 GRAM - (5/CA)

## (undated) DEVICE — PAD BABY LAP 4X18 W/O - RINGS PREWASHED 5/PK 40PK/CS

## (undated) DEVICE — GOWN WARMING STANDARD FLEX - (30/CA)

## (undated) DEVICE — GOWN SURGEONS X-LARGE - DISP. (30/CA)

## (undated) DEVICE — PENCIL ELECTSURG 10FT BTN SWH - (50/CA)

## (undated) DEVICE — TROCAR 5X100 BLADED ADVANCE - FIXATION (6/BX)

## (undated) DEVICE — ELECTRODE DUAL RETURN W/ CORD - (50/PK)

## (undated) DEVICE — ARMREST CRADLE FOAM - (2PR/PK 12PR/CA)

## (undated) DEVICE — GLOVE BIOGEL INDICATOR SZ 6.5 SURGICAL PF LTX - (50PR/BX 4BX/CA)

## (undated) DEVICE — APPICATOR HEMOSTAT ARISTA XL - FLEXITIP (10EA/CA)

## (undated) DEVICE — TRAY FOLEY CATHETER STATLOCK 16FR SURESTEP  (10EA/CA)

## (undated) DEVICE — GLOVE BIOGEL SZ 6.5 SURGICAL PF LTX (50PR/BX 4BX/CA)

## (undated) DEVICE — TUBE E-T HI-LO CUFF 7.0MM (10EA/PK)

## (undated) DEVICE — PACK LAPAROSCOPY - (1/CA)

## (undated) DEVICE — Device

## (undated) DEVICE — BLADE SURGICAL CLIPPER - (50EA/CA)

## (undated) DEVICE — ELECTRODE 5MM LHK LAPSCP STERILE DISP- MEGADYNE  (5/CA)

## (undated) DEVICE — HEMOSTAT ARISTA PWD 5 GRAM - (5/BX)

## (undated) DEVICE — UTERINE MANIP V-CARE STANDARD DAVINCI (8EA/CA)

## (undated) DEVICE — ELECTRODE 850 FOAM ADHESIVE - HYDROGEL RADIOTRNSPRNT (50/PK)